# Patient Record
Sex: MALE | Race: OTHER | HISPANIC OR LATINO | Employment: FULL TIME | URBAN - METROPOLITAN AREA
[De-identification: names, ages, dates, MRNs, and addresses within clinical notes are randomized per-mention and may not be internally consistent; named-entity substitution may affect disease eponyms.]

---

## 2018-05-30 ENCOUNTER — HOSPITAL ENCOUNTER (EMERGENCY)
Facility: HOSPITAL | Age: 28
Discharge: HOME | End: 2018-05-30
Attending: EMERGENCY MEDICINE
Payer: COMMERCIAL

## 2018-05-30 VITALS
RESPIRATION RATE: 16 BRPM | BODY MASS INDEX: 46.65 KG/M2 | TEMPERATURE: 99.9 F | WEIGHT: 315 LBS | OXYGEN SATURATION: 96 % | DIASTOLIC BLOOD PRESSURE: 80 MMHG | HEIGHT: 69 IN | SYSTOLIC BLOOD PRESSURE: 138 MMHG | HEART RATE: 119 BPM

## 2018-05-30 DIAGNOSIS — J03.00 STREPTOCOCCAL TONSILLOPHARYNGITIS: Primary | ICD-10-CM

## 2018-05-30 PROCEDURE — 99283 EMERGENCY DEPT VISIT LOW MDM: CPT

## 2018-05-30 RX ORDER — AMOXICILLIN 250 MG/5ML
500 POWDER, FOR SUSPENSION ORAL ONCE
Status: COMPLETED | OUTPATIENT
Start: 2018-05-30 | End: 2018-05-30

## 2018-05-30 RX ORDER — AMOXICILLIN 400 MG/5ML
800 POWDER, FOR SUSPENSION ORAL 2 TIMES DAILY
Qty: 100 ML | Refills: 0 | Status: SHIPPED | OUTPATIENT
Start: 2018-05-30 | End: 2018-06-09

## 2018-05-30 RX ADMIN — AMOXICILLIN 500 MG: 250 POWDER, FOR SUSPENSION ORAL at 22:30

## 2018-05-31 NOTE — ED PROVIDER NOTES
History  Chief Complaint   Patient presents with    Fever - 9 weeks to 74 years     pt c/o fever 103 0 today    Generalized Body Aches     Started today    Headache     started today    Sore Throat     Started yesterday       This 82-year-old man complains of fever and myalgias with sore throat  This began yesterday  Today his temperature was 103°  He took DayQuil  Patient denies headache, cough, GI and  symptoms  Has no rash  He denies recent foreign travel or exposure to ill people except for his wife who is being treated for pneumonia  None       History reviewed  No pertinent past medical history  History reviewed  No pertinent surgical history  History reviewed  No pertinent family history  I have reviewed and agree with the history as documented  Social History   Substance Use Topics    Smoking status: Current Every Day Smoker     Types: Cigarettes    Smokeless tobacco: Never Used    Alcohol use No        Review of Systems   Constitutional: Positive for fever  Negative for activity change, appetite change and unexpected weight change  HENT: Positive for postnasal drip, sinus pressure and sore throat  Negative for ear pain  Eyes: Negative  Respiratory: Negative  Cardiovascular: Negative  Gastrointestinal: Negative  Endocrine: Negative  Genitourinary: Negative  Musculoskeletal: Positive for myalgias  Negative for arthralgias and neck stiffness  Skin: Negative  Allergic/Immunologic: Negative  Neurological: Negative  Hematological: Negative  Psychiatric/Behavioral: Negative  All other systems reviewed and are negative  Physical Exam  Physical Exam   Constitutional: He is oriented to person, place, and time  He appears well-developed and well-nourished  No distress  obese   HENT:   Head: Normocephalic and atraumatic     Right Ear: External ear normal    Left Ear: External ear normal    Both tonsils are enlarged, red and with exudates  No sign of peritonsillar abscess  Normal vocalizations  Handling secretions normally  Eyes: Conjunctivae and EOM are normal  Pupils are equal, round, and reactive to light  Neck: Normal range of motion  Neck supple  No JVD present  Cardiovascular: Normal rate, regular rhythm, normal heart sounds and intact distal pulses  No murmur heard  Pulmonary/Chest: Effort normal and breath sounds normal    Abdominal: Soft  Bowel sounds are normal  He exhibits no distension and no mass  There is no tenderness  There is no rebound and no guarding  Musculoskeletal: Normal range of motion  He exhibits no edema or tenderness  Lymphadenopathy:     He has no cervical adenopathy  Neurological: He is alert and oriented to person, place, and time  He has normal reflexes  No cranial nerve deficit  Coordination normal    Skin: Skin is warm and dry  Capillary refill takes less than 2 seconds  No rash noted  He is not diaphoretic  Psychiatric: He has a normal mood and affect  His behavior is normal    Nursing note and vitals reviewed        Vital Signs  ED Triage Vitals [05/30/18 2159]   Temperature Pulse Respirations Blood Pressure SpO2   99 9 °F (37 7 °C) (!) 119 16 138/80 96 %      Temp Source Heart Rate Source Patient Position - Orthostatic VS BP Location FiO2 (%)   Temporal Monitor Sitting Right arm --      Pain Score       7           Vitals:    05/30/18 2159   BP: 138/80   Pulse: (!) 119   Patient Position - Orthostatic VS: Sitting       Visual Acuity  Visual Acuity      Most Recent Value   L Pupil Size (mm)  3   R Pupil Size (mm)  3          ED Medications  Medications   amoxicillin (AMOXIL) 250 mg/5 mL oral suspension 500 mg (not administered)       Diagnostic Studies  Results Reviewed     None                 No orders to display              Procedures  Procedures       Phone Contacts  ED Phone Contact    ED Course                               MDM  Number of Diagnoses or Management Options  Streptococcal tonsillopharyngitis: new and does not require workup    CritCare Time    Disposition  Final diagnoses:   Streptococcal tonsillopharyngitis     Time reflects when diagnosis was documented in both MDM as applicable and the Disposition within this note     Time User Action Codes Description Comment    5/30/2018 10:23 PM Jae Faustinotaz Add [J03 00] Streptococcal tonsillopharyngitis       ED Disposition     ED Disposition Condition Comment    Discharge  Yamileth Pennington discharge to home/self care  Condition at discharge: Stable        Follow-up Information     Follow up With Specialties Details Why Jose Puckett  Call  to find a local doctor for follow-up [phone]            Patient's Medications   Discharge Prescriptions    AMOXICILLIN (AMOXIL) 400 MG/5ML SUSPENSION    Take 10 mL (800 mg total) by mouth 2 (two) times a day for 10 days       Start Date: 5/30/2018 End Date: 6/9/2018       Order Dose: 800 mg       Quantity: 100 mL    Refills: 0     No discharge procedures on file      ED Provider  Electronically Signed by           Debbie Marin DO  05/30/18 9033

## 2018-05-31 NOTE — DISCHARGE INSTRUCTIONS
Tonsillitis, Ambulatory Care   GENERAL INFORMATION:   Tonsillitis  is an inflammation of the tonsils  Tonsils are 2 large lumps of tissue in the back of your throat  They help fight infection  Tonsillitis may be caused by a bacterial or a viral infection  Common symptoms include the following:   · Severe sore throat    · Red, swollen tonsils    · Painful swallowing    · Fever and chills    · Bad breath    · White spots on the tonsils  Seek immediate care  if you have trouble breathing because your tonsils are swollen  Treatment for tonsillitis  may include medicine to decrease throat pain  Antibiotic medicine may be given if your tonsillitis was caused by bacteria  You may also need surgery to remove your tonsils for chronic or recurrent tonsillitis  Prevent the spread of germs  by washing your hands often  Do not share food or drinks with anyone  Ask when you can return to work  Manage your symptoms:   · Drink plenty of liquids  to help prevent dehydration  Ask your healthcare provider how much you should drink  · Gargle with warm salt water  to help decrease throat pain  Mix 1 teaspoon of salt in 1 cup of warm water  Ask how often you should do this  Follow up with your healthcare provider as directed:  Write down your questions so you remember to ask them during your visits  CARE AGREEMENT:   You have the right to help plan your care  Learn about your health condition and how it may be treated  Discuss treatment options with your caregivers to decide what care you want to receive  You always have the right to refuse treatment  The above information is an  only  It is not intended as medical advice for individual conditions or treatments  Talk to your doctor, nurse or pharmacist before following any medical regimen to see if it is safe and effective for you    © 2014 [address] [employer] is for End User's use only and may not be sold, redistributed or otherwise used for commercial purposes  All illustrations and images included in CareNotes® are the copyrighted property of A D A M , Inc  or Alex Ann

## 2020-01-18 ENCOUNTER — APPOINTMENT (EMERGENCY)
Dept: RADIOLOGY | Facility: HOSPITAL | Age: 30
End: 2020-01-18
Payer: COMMERCIAL

## 2020-01-18 ENCOUNTER — HOSPITAL ENCOUNTER (EMERGENCY)
Facility: HOSPITAL | Age: 30
Discharge: HOME | End: 2020-01-18
Attending: EMERGENCY MEDICINE | Admitting: EMERGENCY MEDICINE
Payer: COMMERCIAL

## 2020-01-18 VITALS
HEART RATE: 70 BPM | RESPIRATION RATE: 16 BRPM | SYSTOLIC BLOOD PRESSURE: 110 MMHG | OXYGEN SATURATION: 99 % | WEIGHT: 315 LBS | BODY MASS INDEX: 46.65 KG/M2 | DIASTOLIC BLOOD PRESSURE: 58 MMHG | TEMPERATURE: 97.9 F | HEIGHT: 69 IN

## 2020-01-18 DIAGNOSIS — R07.89 ATYPICAL CHEST PAIN: Primary | ICD-10-CM

## 2020-01-18 LAB
ALBUMIN SERPL BCP-MCNC: 3.5 G/DL (ref 3.5–5)
ALP SERPL-CCNC: 103 U/L (ref 46–116)
ALT SERPL W P-5'-P-CCNC: 34 U/L (ref 12–78)
ANION GAP SERPL CALCULATED.3IONS-SCNC: 8 MMOL/L (ref 4–13)
AST SERPL W P-5'-P-CCNC: 16 U/L (ref 5–45)
BASOPHILS # BLD AUTO: 0.07 THOUSANDS/ΜL (ref 0–0.1)
BASOPHILS NFR BLD AUTO: 1 % (ref 0–1)
BILIRUB SERPL-MCNC: 0.3 MG/DL (ref 0.2–1)
BUN SERPL-MCNC: 16 MG/DL (ref 5–25)
CALCIUM SERPL-MCNC: 9.2 MG/DL (ref 8.3–10.1)
CHLORIDE SERPL-SCNC: 106 MMOL/L (ref 100–108)
CO2 SERPL-SCNC: 27 MMOL/L (ref 21–32)
CREAT SERPL-MCNC: 0.93 MG/DL (ref 0.6–1.3)
EOSINOPHIL # BLD AUTO: 0.38 THOUSAND/ΜL (ref 0–0.61)
EOSINOPHIL NFR BLD AUTO: 3 % (ref 0–6)
ERYTHROCYTE [DISTWIDTH] IN BLOOD BY AUTOMATED COUNT: 14.7 % (ref 11.6–15.1)
GFR SERPL CREATININE-BSD FRML MDRD: 112 ML/MIN/1.73SQ M
GLUCOSE SERPL-MCNC: 117 MG/DL (ref 65–140)
HCT VFR BLD AUTO: 41.3 % (ref 36.5–49.3)
HGB BLD-MCNC: 13.3 G/DL (ref 12–17)
IMM GRANULOCYTES # BLD AUTO: 0.08 THOUSAND/UL (ref 0–0.2)
IMM GRANULOCYTES NFR BLD AUTO: 1 % (ref 0–2)
LYMPHOCYTES # BLD AUTO: 2.46 THOUSANDS/ΜL (ref 0.6–4.47)
LYMPHOCYTES NFR BLD AUTO: 16 % (ref 14–44)
MCH RBC QN AUTO: 25.4 PG (ref 26.8–34.3)
MCHC RBC AUTO-ENTMCNC: 32.2 G/DL (ref 31.4–37.4)
MCV RBC AUTO: 79 FL (ref 82–98)
MONOCYTES # BLD AUTO: 0.74 THOUSAND/ΜL (ref 0.17–1.22)
MONOCYTES NFR BLD AUTO: 5 % (ref 4–12)
NEUTROPHILS # BLD AUTO: 11.3 THOUSANDS/ΜL (ref 1.85–7.62)
NEUTS SEG NFR BLD AUTO: 74 % (ref 43–75)
NRBC BLD AUTO-RTO: 0 /100 WBCS
PLATELET # BLD AUTO: 293 THOUSANDS/UL (ref 149–390)
PMV BLD AUTO: 9.3 FL (ref 8.9–12.7)
POTASSIUM SERPL-SCNC: 3.6 MMOL/L (ref 3.5–5.3)
PROT SERPL-MCNC: 7.5 G/DL (ref 6.4–8.2)
RBC # BLD AUTO: 5.24 MILLION/UL (ref 3.88–5.62)
SODIUM SERPL-SCNC: 141 MMOL/L (ref 136–145)
TROPONIN I SERPL-MCNC: <0.02 NG/ML
WBC # BLD AUTO: 15.03 THOUSAND/UL (ref 4.31–10.16)

## 2020-01-18 PROCEDURE — 80053 COMPREHEN METABOLIC PANEL: CPT

## 2020-01-18 PROCEDURE — 71046 X-RAY EXAM CHEST 2 VIEWS: CPT

## 2020-01-18 PROCEDURE — 36415 COLL VENOUS BLD VENIPUNCTURE: CPT

## 2020-01-18 PROCEDURE — 85025 COMPLETE CBC W/AUTO DIFF WBC: CPT

## 2020-01-18 PROCEDURE — 93005 ELECTROCARDIOGRAM TRACING: CPT

## 2020-01-18 PROCEDURE — 84484 ASSAY OF TROPONIN QUANT: CPT

## 2020-01-18 PROCEDURE — 99285 EMERGENCY DEPT VISIT HI MDM: CPT

## 2020-01-18 PROCEDURE — 99284 EMERGENCY DEPT VISIT MOD MDM: CPT | Performed by: PHYSICIAN ASSISTANT

## 2020-01-18 RX ORDER — ESCITALOPRAM OXALATE 20 MG/1
20 TABLET ORAL DAILY
COMMUNITY

## 2020-01-18 RX ORDER — GABAPENTIN 600 MG/1
600 TABLET ORAL 3 TIMES DAILY
COMMUNITY

## 2020-01-18 RX ORDER — TOPIRAMATE 25 MG/1
25 TABLET ORAL 2 TIMES DAILY
COMMUNITY

## 2020-01-18 RX ORDER — PANTOPRAZOLE SODIUM 40 MG/1
40 TABLET, DELAYED RELEASE ORAL DAILY
Qty: 14 TABLET | Refills: 0 | Status: SHIPPED | OUTPATIENT
Start: 2020-01-18 | End: 2020-02-01

## 2020-01-18 RX ORDER — ROSUVASTATIN CALCIUM 10 MG/1
10 TABLET, COATED ORAL DAILY
COMMUNITY

## 2020-01-18 NOTE — ED PROVIDER NOTES
History  Chief Complaint   Patient presents with    Chest Pain     Pt started to have chest pain/palpitations @1330, +nausea     53-year-old with history of anxiety and morbid obesity presents emergency department for evaluation chest pain  Patient relays a history intermittent episodes of central chest discomfort described as squeezing over the past several months  He previously had this evaluated at [facility] by Cardiology, underwent a stress test and echocardiogram which were reportedly unremarkable  States today while driving his car developed severe pain and felt like I was going to die  Pain lasted less than 10 minutes before resolving spontaneously  He denies associated nausea, vomiting, dizziness, extremity paresthesias  The symptoms are typical for his recent history of chest pain  Typically does not experience exertional worsening with his symptoms  He does have a strong family history of cardiovascular disease, as his mother  of a heart attack at the age of 34  He has no personal history of hypertension, hyperlipidemia, diabetes, and is a nonsmoker          Prior to Admission Medications   Prescriptions Last Dose Informant Patient Reported? Taking?   escitalopram (LEXAPRO) 20 mg tablet 2020 at Unknown time  Yes Yes   Sig: Take 20 mg by mouth daily   gabapentin (NEURONTIN) 600 MG tablet 2020 at Unknown time  Yes Yes   Sig: Take 600 mg by mouth 3 (three) times a day   rosuvastatin (CRESTOR) 10 MG tablet 2020 at Unknown time  Yes Yes   Sig: Take 10 mg by mouth daily   topiramate (TOPAMAX) 25 mg tablet 2020 at Unknown time  Yes Yes   Sig: Take 25 mg by mouth 2 (two) times a day      Facility-Administered Medications: None       History reviewed  No pertinent past medical history  History reviewed  No pertinent surgical history  History reviewed  No pertinent family history  I have reviewed and agree with the history as documented      Social History     Tobacco Use  Smoking status: Former Smoker     Types: Cigarettes    Smokeless tobacco: Never Used   Substance Use Topics    Alcohol use: No    Drug use: No        Review of Systems   Constitutional: Negative for chills, diaphoresis and fever  Eyes: Negative for visual disturbance  Respiratory: Negative for cough and shortness of breath  Cardiovascular: Positive for chest pain  Negative for palpitations  Gastrointestinal: Negative for abdominal pain, diarrhea, nausea and vomiting  Genitourinary: Negative for dysuria, flank pain and frequency  Musculoskeletal: Negative for arthralgias and myalgias  Skin: Negative for color change, rash and wound  Allergic/Immunologic: Negative for immunocompromised state  Neurological: Negative for dizziness and light-headedness  Hematological: Does not bruise/bleed easily  Psychiatric/Behavioral: Negative for confusion  The patient is nervous/anxious  Physical Exam  Physical Exam   Constitutional: He is oriented to person, place, and time  He appears well-developed and well-nourished  No distress  HENT:   Head: Normocephalic and atraumatic  Eyes: Pupils are equal, round, and reactive to light  No scleral icterus  Neck: No JVD present  Cardiovascular: Normal rate and regular rhythm  Exam reveals no gallop and no friction rub  No murmur heard  Pulses:       Radial pulses are 2+ on the right side, and 2+ on the left side  Pulmonary/Chest: No respiratory distress  He has no wheezes  He has no rhonchi  He has no rales  Musculoskeletal:        Right lower leg: He exhibits no edema  Left lower leg: He exhibits no edema  Neurological: He is alert and oriented to person, place, and time  Skin: Skin is warm and dry  Capillary refill takes less than 2 seconds  He is not diaphoretic  Vitals reviewed        Vital Signs  ED Triage Vitals [01/18/20 1450]   Temperature Pulse Respirations Blood Pressure SpO2   97 9 °F (36 6 °C) 83 (!) 24 145/63 96 % Temp Source Heart Rate Source Patient Position - Orthostatic VS BP Location FiO2 (%)   Temporal Monitor Lying Right arm --      Pain Score       4           Vitals:    01/18/20 1450 01/18/20 1515 01/18/20 1555   BP: 145/63 132/68 110/58   Pulse: 83 73 70   Patient Position - Orthostatic VS: Lying Lying Lying         Visual Acuity      ED Medications  Medications - No data to display    Diagnostic Studies  Results Reviewed     Procedure Component Value Units Date/Time    Comprehensive metabolic panel [[id number]] Collected:  01/18/20 1457    Lab Status:  Final result Specimen:  Blood from Hand, Left Updated:  01/18/20 1539     Sodium 141 mmol/L      Potassium 3 6 mmol/L      Chloride 106 mmol/L      CO2 27 mmol/L      ANION GAP 8 mmol/L      BUN 16 mg/dL      Creatinine 0 93 mg/dL      Glucose 117 mg/dL      Calcium 9 2 mg/dL      AST 16 U/L      ALT 34 U/L      Alkaline Phosphatase 103 U/L      Total Protein 7 5 g/dL      Albumin 3 5 g/dL      Total Bilirubin 0 30 mg/dL      eGFR 112 ml/min/1 73sq m     Narrative:       [place] guidelines for Chronic Kidney Disease (CKD):     Stage 1 with normal or high GFR (GFR > 90 mL/min/1 73 square meters)    Stage 2 Mild CKD (GFR = 60-89 mL/min/1 73 square meters)    Stage 3A Moderate CKD (GFR = 45-59 mL/min/1 73 square meters)    Stage 3B Moderate CKD (GFR = 30-44 mL/min/1 73 square meters)    Stage 4 Severe CKD (GFR = 15-29 mL/min/1 73 square meters)    Stage 5 End Stage CKD (GFR <15 mL/min/1 73 square meters)  Note: GFR calculation is accurate only with a steady state creatinine    Troponin I [[id number]]  (Normal) Collected:  01/18/20 1457    Lab Status:  Final result Specimen:  Blood from Hand, Left Updated:  01/18/20 1528     Troponin I <0 02 ng/mL     CBC and differential [[id number]]  (Abnormal) Collected:  01/18/20 1457    Lab Status:  Final result Specimen:  Blood from Hand, Left Updated:  01/18/20 1508     WBC 15 03 Thousand/uL RBC 5 24 Million/uL      Hemoglobin 13 3 g/dL      Hematocrit 41 3 %      MCV 79 fL      MCH 25 4 pg      MCHC 32 2 g/dL      RDW 14 7 %      MPV 9 3 fL      Platelets 428 Thousands/uL      nRBC 0 /100 WBCs      Neutrophils Relative 74 %      Immat GRANS % 1 %      Lymphocytes Relative 16 %      Monocytes Relative 5 %      Eosinophils Relative 3 %      Basophils Relative 1 %      Neutrophils Absolute 11 30 Thousands/µL      Immature Grans Absolute 0 08 Thousand/uL      Lymphocytes Absolute 2 46 Thousands/µL      Monocytes Absolute 0 74 Thousand/µL      Eosinophils Absolute 0 38 Thousand/µL      Basophils Absolute 0 07 Thousands/µL                  XR chest 2 views   ED Interpretation by Poonam Hearn PA-C (01/18 1535)   No acute disease      Final Result by Estephania Meade MD (01/18 1639)      No acute cardiopulmonary disease              Workstation performed: [id number]                    Procedures  ECG 12 Lead Documentation Only  Date/Time: 1/18/2020 2:52 PM  Performed by: Poonam Hearn PA-C  Authorized by: Poonam Hearn PA-C     Indications / Diagnosis:  Chest pain  ECG reviewed by me, the ED Provider: yes    Patient location:  ED  Previous ECG:     Previous ECG:  Unavailable  Interpretation:     Interpretation: normal    Rate:     ECG rate:  76    ECG rate assessment: normal    Rhythm:     Rhythm: sinus rhythm    Ectopy:     Ectopy: none    QRS:     QRS axis:  Normal    QRS intervals:  Normal  Conduction:     Conduction: normal    ST segments:     ST segments:  Normal  T waves:     T waves: normal               ED Course         HEART Risk Score      Most Recent Value   History  0 Filed at: 01/18/2020 1532   ECG  0 Filed at: 01/18/2020 1532   Age  0 Filed at: 01/18/2020 1532   Risk Factors  1 Filed at: 01/18/2020 1532   Troponin  0 Filed at: 01/18/2020 1532   Heart Score Risk Calculator   History  0 Filed at: 01/18/2020 1532   ECG  0 Filed at: 01/18/2020 1532   Age  0 Filed at: 01/18/2020 1532   Risk Factors  1 Filed at: 01/18/2020 1532   Troponin  0 Filed at: 01/18/2020 1532   HEART Score  1 Filed at: 01/18/2020 1532   HEART Score  1 Filed at: 01/18/2020 1532                            [facility]  Number of Diagnoses or Management Options  Atypical chest pain: new and requires workup  Diagnosis management comments: Patient remained asymptomatic in the emergency department  EKG is unremarkable and cardiac enzymes are negative  Low level suspicion for cardiac source, particularly given his negative stress test and echocardiogram in the past   Recommend he follow-up with his primary care doctor for further risk stratification  Meets perc criteria, no evidence of PE  Amount and/or Complexity of Data Reviewed  Clinical lab tests: ordered and reviewed  Tests in the radiology section of CPT®: ordered and reviewed  Tests in the medicine section of CPT®: ordered and reviewed  Review and summarize past medical records: yes  Independent visualization of images, tracings, or specimens: yes          Disposition  Final diagnoses:   Atypical chest pain     Time reflects when diagnosis was documented in both MDM as applicable and the Disposition within this note     Time User Action Codes Description Comment    1/18/2020  3:41 PM Devang Hutson Add [R07 89] Atypical chest pain       ED Disposition     ED Disposition Condition Date/Time Comment    Discharge Stable Sat Jan 18, 2020  3:41 PM Baldo Mtz discharge to home/self care              Follow-up Information     Follow up With Specialties Details Why Contact Info Additional [facility] Schedule an appointment as soon as possible for a visit   Cecily De La Cruz [phone] [facility] [phone]  [address]  Cardiology [phone], [address] [place] [address] Gas[facility] Devine, [place], [facility]          Discharge Medication List as of 1/18/2020  3:49 PM      CONTINUE these medications which have NOT CHANGED    Details   escitalopram (LEXAPRO) 20 mg tablet Take 20 mg by mouth daily, Historical Med      gabapentin (NEURONTIN) 600 MG tablet Take 600 mg by mouth 3 (three) times a day, Historical Med      rosuvastatin (CRESTOR) 10 MG tablet Take 10 mg by mouth daily, Historical Med      topiramate (TOPAMAX) 25 mg tablet Take 25 mg by mouth 2 (two) times a day, Historical Med           No discharge procedures on file      ED Provider  Electronically Signed by           Ninfa Malik PA-C  01/18/20 9545

## 2020-01-19 LAB
ATRIAL RATE: 76 BPM
P AXIS: 38 DEGREES
PR INTERVAL: 166 MS
QRS AXIS: 58 DEGREES
QRSD INTERVAL: 82 MS
QT INTERVAL: 370 MS
QTC INTERVAL: 416 MS
T WAVE AXIS: 31 DEGREES
VENTRICULAR RATE: 76 BPM

## 2020-01-19 PROCEDURE — 93010 ELECTROCARDIOGRAM REPORT: CPT | Performed by: INTERNAL MEDICINE

## 2022-04-18 ENCOUNTER — APPOINTMENT (OUTPATIENT)
Dept: LAB | Facility: CLINIC | Age: 32
End: 2022-04-18
Payer: COMMERCIAL

## 2022-04-18 DIAGNOSIS — R06.02 SHORTNESS OF BREATH: ICD-10-CM

## 2022-04-18 DIAGNOSIS — R41.89 BRAIN FOG: ICD-10-CM

## 2022-04-18 DIAGNOSIS — E66.01 MORBID OBESITY (HCC): ICD-10-CM

## 2022-04-18 DIAGNOSIS — E78.2 MIXED HYPERLIPIDEMIA: ICD-10-CM

## 2022-04-18 DIAGNOSIS — J45.40 MODERATE PERSISTENT ASTHMA WITHOUT COMPLICATION: ICD-10-CM

## 2022-04-18 DIAGNOSIS — R52 BODY ACHES: ICD-10-CM

## 2022-04-18 DIAGNOSIS — I10 ESSENTIAL HYPERTENSION: ICD-10-CM

## 2022-04-18 DIAGNOSIS — M25.50 ARTHRALGIA, UNSPECIFIED JOINT: ICD-10-CM

## 2022-04-18 LAB
ALBUMIN SERPL BCP-MCNC: 3.5 G/DL (ref 3.5–5)
ALP SERPL-CCNC: 101 U/L (ref 46–116)
ALT SERPL W P-5'-P-CCNC: 44 U/L (ref 12–78)
ANION GAP SERPL CALCULATED.3IONS-SCNC: 6 MMOL/L (ref 4–13)
AST SERPL W P-5'-P-CCNC: 20 U/L (ref 5–45)
BASOPHILS # BLD AUTO: 0.09 THOUSANDS/ΜL (ref 0–0.1)
BASOPHILS NFR BLD AUTO: 1 % (ref 0–1)
BILIRUB SERPL-MCNC: 0.53 MG/DL (ref 0.2–1)
BUN SERPL-MCNC: 12 MG/DL (ref 5–25)
CALCIUM SERPL-MCNC: 9.2 MG/DL (ref 8.3–10.1)
CHLORIDE SERPL-SCNC: 110 MMOL/L (ref 100–108)
CHOLEST SERPL-MCNC: 192 MG/DL
CO2 SERPL-SCNC: 26 MMOL/L (ref 21–32)
CREAT SERPL-MCNC: 0.91 MG/DL (ref 0.6–1.3)
CRP SERPL HS-MCNC: 26.1 MG/L
EOSINOPHIL # BLD AUTO: 0.5 THOUSAND/ΜL (ref 0–0.61)
EOSINOPHIL NFR BLD AUTO: 5 % (ref 0–6)
ERYTHROCYTE [DISTWIDTH] IN BLOOD BY AUTOMATED COUNT: 15.5 % (ref 11.6–15.1)
ERYTHROCYTE [SEDIMENTATION RATE] IN BLOOD: 47 MM/HOUR (ref 0–14)
GFR SERPL CREATININE-BSD FRML MDRD: 113 ML/MIN/1.73SQ M
GLUCOSE P FAST SERPL-MCNC: 84 MG/DL (ref 65–99)
HCT VFR BLD AUTO: 44.7 % (ref 36.5–49.3)
HDLC SERPL-MCNC: 29 MG/DL
HGB BLD-MCNC: 14.1 G/DL (ref 12–17)
IMM GRANULOCYTES # BLD AUTO: 0.05 THOUSAND/UL (ref 0–0.2)
IMM GRANULOCYTES NFR BLD AUTO: 1 % (ref 0–2)
LDLC SERPL CALC-MCNC: 134 MG/DL (ref 0–100)
LYMPHOCYTES # BLD AUTO: 2.02 THOUSANDS/ΜL (ref 0.6–4.47)
LYMPHOCYTES NFR BLD AUTO: 22 % (ref 14–44)
MCH RBC QN AUTO: 25.3 PG (ref 26.8–34.3)
MCHC RBC AUTO-ENTMCNC: 31.5 G/DL (ref 31.4–37.4)
MCV RBC AUTO: 80 FL (ref 82–98)
MONOCYTES # BLD AUTO: 0.64 THOUSAND/ΜL (ref 0.17–1.22)
MONOCYTES NFR BLD AUTO: 7 % (ref 4–12)
NEUTROPHILS # BLD AUTO: 6.06 THOUSANDS/ΜL (ref 1.85–7.62)
NEUTS SEG NFR BLD AUTO: 64 % (ref 43–75)
NONHDLC SERPL-MCNC: 163 MG/DL
NRBC BLD AUTO-RTO: 0 /100 WBCS
PLATELET # BLD AUTO: 305 THOUSANDS/UL (ref 149–390)
PMV BLD AUTO: 10.5 FL (ref 8.9–12.7)
POTASSIUM SERPL-SCNC: 4.4 MMOL/L (ref 3.5–5.3)
PROT SERPL-MCNC: 7.4 G/DL (ref 6.4–8.2)
RBC # BLD AUTO: 5.57 MILLION/UL (ref 3.88–5.62)
SODIUM SERPL-SCNC: 142 MMOL/L (ref 136–145)
TRIGL SERPL-MCNC: 147 MG/DL
WBC # BLD AUTO: 9.36 THOUSAND/UL (ref 4.31–10.16)

## 2022-04-18 PROCEDURE — 86038 ANTINUCLEAR ANTIBODIES: CPT

## 2022-04-18 PROCEDURE — 85652 RBC SED RATE AUTOMATED: CPT

## 2022-04-18 PROCEDURE — 36415 COLL VENOUS BLD VENIPUNCTURE: CPT

## 2022-04-18 PROCEDURE — 85025 COMPLETE CBC W/AUTO DIFF WBC: CPT

## 2022-04-18 PROCEDURE — 86141 C-REACTIVE PROTEIN HS: CPT

## 2022-04-18 PROCEDURE — 86618 LYME DISEASE ANTIBODY: CPT

## 2022-04-18 PROCEDURE — 80053 COMPREHEN METABOLIC PANEL: CPT

## 2022-04-18 PROCEDURE — 80061 LIPID PANEL: CPT

## 2022-04-19 LAB
ANA SPECKLED TITR SER: ABNORMAL {TITER}
ANA TITR SER IF: POSITIVE {TITER}
B BURGDOR IGG+IGM SER-ACNC: 17
Lab: ABNORMAL
SL AMB NOTE:: ABNORMAL

## 2023-03-08 ENCOUNTER — OFFICE VISIT (OUTPATIENT)
Dept: BEHAVIORAL/MENTAL HEALTH CLINIC | Facility: CLINIC | Age: 33
End: 2023-03-08

## 2023-03-08 ENCOUNTER — DOCUMENTATION (OUTPATIENT)
Dept: BEHAVIORAL/MENTAL HEALTH CLINIC | Facility: CLINIC | Age: 33
End: 2023-03-08

## 2023-03-08 DIAGNOSIS — F32.A ANXIETY AND DEPRESSION: Primary | ICD-10-CM

## 2023-03-08 DIAGNOSIS — F41.9 ANXIETY AND DEPRESSION: Primary | ICD-10-CM

## 2023-03-08 NOTE — PROGRESS NOTES
Assessment      Crisis Intake  Patient Intake  Living Arrangement: Apartment  Can patient return home?: Yes  Address to be Discharge to<[facility]> [address] [place] [address]  Nga LIZ  Patient's Telephone Number: [phone]  Access to Firearms: No  Type of Work:   Work History: Full-time  Admission Status  Status of Admission: Other (Comment) (pt looking for outpatient service)  [place] of Residence: [address] [place] [address] Notified?: No  Patient History  Presenting Problems: Pt is a 29year old male currently  with 4 children  Pt and spouse moved into [facility] one year ago and has not had outpatient services since this time  Pt  Had own jason business, 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  Due to work, pt would be gone for several weeks at a time, which contributed to stressors in the marriage  Pt  Now works for a [employer] and is home more often, but still in debt due to his former company closing  PT admits that some poor business decisions had contributed to the business closure  Pt also admits to some other stressors in the marriage including viewing pornograpy, and being four months behind in their rent for their residence  Pt has noticed over the past few weeks that his wife has been texting someone , but has been secretive about whom she was texting  When confronted, the wife admits that she was texting a former therapist  This evening the wife was texting again to the therapist and around midnight, there was a phone call from "[address]" on the wife's phone  The pt was about to answer his wife's phone, but the wife stated that this was the title she put in her phone for the former therapist  The pt's wife suffers with depression  There are four children, the oldest being [de-identified], and the youngest being born in Nov 2022  The pt  Worries about the children and his wife being alone all day, and having no services  The wife suffers from depression   Pt fears that they will end in divorce, but would like to salvage their marriage  As of this time, the pt is requesting out patient services with a psych and therapist  These appointments have been set up for March 30, at 1 PM with Psych and 2 PM with the therapist  Treatment History: PT had out patient services over a year ago, but had not followed up  Currently in Treatment: No  Substance Abuse: No  Mental Status Exam  Orientation Level: Oriented X4  Affect: Appropriate  Speech: Logical/coherent, Soft  Mood: Depressed  Thought Content: Appropriate  Hallucination Type: No problems reported or observed    Judgement: Good  Impulse Control: Good  Attention Span: Appropriate for age  Memory: Intact  Appetite: Good  Sleep: Fair  Total Hours of Sleep: 6-8  Appear/Hygiene: Neatly Groomed  Strengths and Limitations  Patient Strengths: Self reliant, Insightful, Physically healthy, Cooperative, Interpersonal skills, Negotiates basic needs  Patient Limitations: Limited family ties, Limited support system  Ideations  Current Self Harm/Suicidal Ideation: No  Previous Self Harm/Suicidal Ideation: No  Violence Risk to Self: Denies ideation within past 6 months  Current homicidal or violent thoughts toward another: Denies ideations within past 6 months  Description of current thoughts/plans[de-identified] no current thoughts or plans  Previous Plans to Harm Another Person: No  Violence Risk to Others: Denies within past 6 months  Previous History of Violence to Others: No  Provisional Diagnosis  Axis I: anxiety  Axis II: depression  Intake Assessment Outcome  Patient Plan: Outpatient    C-SSRS         Patient was referred by: Self or Family    Visit start and stop times:    03/08/23  Start Time: (P) 0130  Stop Time: (P) 0200  Total Visit Time: (P) 30 minutes            Pt was scheduled with Ines for 3/30/23 and Kurt Goldberg for 3/30/23        SAFETY PLAN  Warning Signs (thoughts, images, mood, behavior, situations) of a potential crisis: Pt fears divorce and has already started filing paper work  Coping Skills (what can I do to take my mind off the problem, or to keep myself safe): 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

## 2023-03-08 NOTE — PATIENT INSTRUCTIONS
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

## 2023-03-09 ENCOUNTER — TELEPHONE (OUTPATIENT)
Dept: BEHAVIORAL/MENTAL HEALTH CLINIC | Facility: CLINIC | Age: 33
End: 2023-03-09

## 2023-03-30 ENCOUNTER — EVALUATION (OUTPATIENT)
Dept: PHYSICAL THERAPY | Facility: CLINIC | Age: 33
End: 2023-03-30

## 2023-03-30 ENCOUNTER — OFFICE VISIT (OUTPATIENT)
Dept: BEHAVIORAL/MENTAL HEALTH CLINIC | Facility: CLINIC | Age: 33
End: 2023-03-30

## 2023-03-30 ENCOUNTER — OFFICE VISIT (OUTPATIENT)
Dept: PSYCHIATRY | Facility: CLINIC | Age: 33
End: 2023-03-30

## 2023-03-30 VITALS — SYSTOLIC BLOOD PRESSURE: 125 MMHG | HEART RATE: 80 BPM | DIASTOLIC BLOOD PRESSURE: 80 MMHG

## 2023-03-30 DIAGNOSIS — F41.0 PANIC DISORDER WITHOUT AGORAPHOBIA: ICD-10-CM

## 2023-03-30 DIAGNOSIS — F41.1 GAD (GENERALIZED ANXIETY DISORDER): Primary | ICD-10-CM

## 2023-03-30 DIAGNOSIS — F33.0 MILD EPISODE OF RECURRENT MAJOR DEPRESSIVE DISORDER (HCC): ICD-10-CM

## 2023-03-30 DIAGNOSIS — F41.1 GENERALIZED ANXIETY DISORDER: Primary | ICD-10-CM

## 2023-03-30 DIAGNOSIS — F33.1 MAJOR DEPRESSIVE DISORDER, RECURRENT, MODERATE (HCC): ICD-10-CM

## 2023-03-30 DIAGNOSIS — R42 DIZZINESS: Primary | ICD-10-CM

## 2023-03-30 RX ORDER — ADALIMUMAB 40MG/0.4ML
KIT SUBCUTANEOUS
COMMUNITY
Start: 2023-03-02

## 2023-03-30 RX ORDER — BUSPIRONE HYDROCHLORIDE 15 MG/1
15 TABLET ORAL 2 TIMES DAILY
COMMUNITY
Start: 2021-03-29 | End: 2023-04-26

## 2023-03-30 RX ORDER — LANOLIN ALCOHOL/MO/W.PET/CERES
100 CREAM (GRAM) TOPICAL DAILY
COMMUNITY
Start: 2023-03-21 | End: 2024-03-20

## 2023-03-30 RX ORDER — ESCITALOPRAM OXALATE 10 MG/1
10 TABLET ORAL DAILY
Qty: 30 TABLET | Refills: 0 | Status: SHIPPED | OUTPATIENT
Start: 2023-03-30

## 2023-03-30 RX ORDER — PROPRANOLOL HYDROCHLORIDE 10 MG/1
10 TABLET ORAL AS NEEDED
Qty: 30 TABLET | Refills: 0 | Status: SHIPPED | OUTPATIENT
Start: 2023-03-30

## 2023-03-30 NOTE — PROGRESS NOTES
PT Evaluation     Today's date: 3/30/2023  Patient name: Lisset Phillips III  : 1990  MRN: [id number]  Referring provider: No ref  provider found  Dx:   Encounter Diagnosis     ICD-10-CM    1  Dizziness  R42           Start Time: 730  Stop Time: 815  Total time in clinic (min): 45 minutes    Assessment  Assessment details: Pt is a 29 yr old male presenting to skilled outpatient physical therapy with chief complaint of dizziness and feeling unsteady  His current impairments include double vision, headaches, lightheadedness, and feeling like he's floating  His oculomotor testing showed positive gaze holding nystagmus in his L eye only  While performing, occulomotor exam, patient reported he has diplopia and double vision is worse in his L eye, so eye doctor referral was recommended  During FGA, he demo good balance but was symptomatic with amb with HT  Due to visual impairments and diplopia, OT eval recommended to determine if OT services are warranted to address visual deficits  Visual impairments likely main cause of his dizziness however due to hx of HA, vestibular migraines may be possible differential diagnosis  His current impairments listed above prevent him from performing ADL's I, working as a , and performing outdoor activities at his house  He would be a good candidate for skilled physical therapy to address these functional limitations and return him to his PLOF  Understanding of Dx/Px/POC: good   Prognosis: good    Goals  STG 3 weeks:  1  Pt will demo understanding of POC/diagnoses and HEP  2  Pt will report a subjective decrease in his dizziness and s/s by 50%  LTG 6 weeks:  1  Pt will improve his FGA score > 3 points or more  2  Pt will be able to perform ADL's w/o limitations       Plan  Patient would benefit from: skilled occupational therapy and skilled physical therapy  Referral necessary: Yes  Planned therapy interventions: neuromuscular re-education, therapeutic activities, "therapeutic exercise, balance/weight bearing training, graded activity and home exercise program  Frequency: 2x week  Duration in weeks: 6  Plan of Care beginning date: 3/30/2023  Plan of Care expiration date: 5/30/2023  Treatment plan discussed with: patient        Subjective Evaluation    History of Present Illness  Mechanism of injury: Since December, he has had dizziness and eyesight has changed and went to [facility] but urgent care said there was no change  Things seems to move and he is sensitive to light  Had \"EEG\" performed while in  but did not show anything  Driving at night w/ snow was very difficult and disorienting  Hx of fall about 2 months ago, felt like he lost sense of balance  Feels brain is floating and is very \"light\"  Dizziness progressively worse over the last 2-3 weeks and currently having double vision  Works as   Better: sleeping  Worse: blood pressure going up, strenuous activities, walking up stairs quickly  Floaters across the L eye more recently  Did also report this weird sensation of his vision being affected in his L eye, where it feels like areas turn black/white and static looking  Recent N/T in b/l hands w/ 5th digit  Not a recurrent problem   Quality of life: good    Pain  No pain reported    Treatments  No previous or current treatments  Patient Goals  Patient goal: feel better, get rid of dizziness, performing ADL's         Objective     Concurrent Complaints  Positive for headaches, tinnitus, visual change and poor concentration  Negative for nausea/motion sickness, hearing loss, memory loss, aural fullness and peripheral neuropathy    Active Range of Motion   Cervical/Thoracic Spine       Cervical    Flexion: Neck active flexion: WNL all palnes    Neuro Exam:     Dizziness  Positive for disequilibrium, vertigo, motion sickness, light-headedness, diplopia and floating or swimming  Negative for oscillopsia and rocking or swaying   " Exacerbating factors  Positive for bending over, rolling in bed, looking up, walking, turning head and optokinetic movement  Negative for supine to/from sitting and walking in busy environment  Symptoms   Duration: 4 months   Frequency: constantly, more often than not   Intensity at best: 3/10  Intensity at worst: 10/10  Average intensity: 6/10    Headaches   Patient reports headaches: Yes     Frequency: every day   Duration: hours-day   Intensity at best: 0/10  Intensity at worst: 10/10  Average intensity: 3/10  Location: top of head   Exacerbating factors: not sure   Relieving factors: time     Cervical exam   Modified VBI   Left: asymptomatic  Right: asymptomatic    Oculomotor exam   Oculomotor ROM: WNL  Resting nystagmus: not present   Gaze holding nystagmus: present left  Gaze holding nystagmus: not present right  Smooth pursuits: within normal limits  Vertical saccades: normal  Horizontal saccades: normal  Convergence: abnormal  Cover test: normal  Head thrust: left normal and right normal    EO, Feet together- 30 sec  EC, Feet together- 30 sec   EO, Feet together on foam- 30 sec   EC, Feet together on foam- 8 sec         Re-eval Date: 4/30    Date 3/30       Visit Count 1       FOTO        Pain In        Pain Out             Precautions none       Manuals                                        Neuro Re-Ed         Amb with HT/HN         Pencil push ups         Dynavision         Static balance EC foam         Education on prognosis/diagnosis 10 min                        Ther Ex                                                                        Ther Activity                        Gait Training                        Modalities

## 2023-03-30 NOTE — PSYCH
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  Due to work, pt would be gone for several weeks at a time, which contributed to stressors in the marriage  Pt  Now works for a [employer] and is home more often, but still in debt due to his former company closing  PT admits that some poor business decisions had contributed to the business closure  Pt also admits to some other stressors in the marriage including viewing pornograpy, and being four months behind in their rent for their residence  Pt has noticed over the past few weeks that his wife has been texting someone , but has been secretive about whom she was texting  When confronted, the wife admits that she was texting a former therapist  This evening the wife was texting again to the therapist and around midnight, there was a phone call from Ishmael Ordoñez" on the wife's phone  The pt was about to answer his wife's phone, but the wife stated that this was the title she put in her phone for the former therapist  The pt's wife suffers with depression  There are four children, the oldest being [de-identified], and the youngest being born in Nov 2022  The pt  Worries about the children and his wife being alone all day, and having no services  The wife suffers from depression  Pt fears that they will end in divorce, but would like to salvage their marriage   As of this time, the pt is requesting out patient services with a psych and therapist  These appointments have been set up for March 30, at 1 PM with Psych and 2 PM with the " "therapist  Previous Psychiatric/psychological treatment/year: Psychiatrist in [place] - depression, anxiety and panic disorder    Current Psychiatrist/Therapist: Sera Ferrari   Outpatient and/or Partial and Other [employer] Used ([facility], [facility], [facility]): Inpatient when he was a teenager for cutting  and history of outpatient       Problem Assessment:     SOCIAL/VOCATION:  Family Constellation (include parents, relationship with each and pertinent Psych/Medical History):     No family history on file  Mother:  in 36 from a sickness  Spouse: wife - Riley Ege - \"chance relationship\" \"we have talked about divorce\"   Father: \"ok relationship\" lives 40 minutes away \"more friends than father/son relationship\"   Children: 4 children (son 5 in May, daughter 9, daughter 10, daughter 10 months old)  Sibling: sister - does not talk too much, she isn't good at getting back to anyone  Siblin half brothers - older brother \"we talk and have a better relationship than my sister\" the younger one I haven't seen since he is an infant and he will be 16      Lidya Lamine relates best to \"I'm a loner\"  he lives with wife and kids  he does not live alone  Domestic Violence: No past history of domestic violence    Additional Comments related to family/relationships/peer support: has some friends, best friends's ex-wife is a big support  School or Work History (strengths/limitations/needs):  Drives train trucks     Her highest grade level achieved was diploma      history includes none     Financial status includes works full time, medicaid insurance     LEISURE ASSESSMENT (Include past and present hobbies/interests and level of involvement (Ex: Group/Club Affiliations): video games, tv, music   his primary language is [place]  Preferred language is [place]  Ethnic considerations are none  Religions affiliations and level of involvement none   Does spirituality help you cope?  No    FUNCTIONAL STATUS: There has been a " recent change in Iban ability to do the following: [facility]    Level of Assistance Needed/By Whom?: self    Iban learns best by  demonstration    SUBSTANCE ABUSE ASSESSMENT: no substance abuse    Substance/Route/Age/Amount/Frequency/Last Use: none    DETOX HISTORY: none    Previous detox/rehab treatment: no    HEALTH ASSESSMENT: PCP not notified     LEGAL: No Mental Health Advance Directive or Power of  on file    Prenatal History: N/A    Delivery History: N/A    Developmental Milestones: N/A  Temperament as an infant was N/A  Temperament as a toddler was N/A  Temperament at school age was normal   Temperament as a teenager was normal     Risk Assessment:   The following ratings are based on my interview(s) with Félix Higgins of Harm to Self:   Demographic risk factors include  and male  Historical Risk Factors include a relative or close friend who  by suicide, chronic psychiatric problems, history of suicidal behaviors/attempts and self-mutilating behaviors  Recent Specific Risk Factors include diagnosis of depression   Additional Factors for a Child or Adolescent adult    Risk of Harm to Others:   Demographic Risk Factors include male  Historical Risk Factors include victim of childhood bullying  Recent Specific Risk Factors include multiple stressors    Access to Weapons:   Iban has access to the following weapons: 1 gun   The following steps have been taken to ensure weapons are properly secured: lock box     Based on the above information, the client presents the following risk of harm to self or others:  low    The following interventions are recommended:   no intervention changes    Notes regarding this Risk Assessment: Has protective factors of a job, family unit, and coping skills         Review Of Systems:     Mood Normal   Behavior Normal    Thought Content Normal   General Relationship Problems and Emotional Problems   Personality Normal   Other Psych Symptoms Normal   Constitutional As Noted in HPI   ENT As Noted in HPI   Cardiovascular As Noted in HPI   Respiratory As Noted in HPI   Gastrointestinal As Noted in HPI   Genitourinary As Noted in HPI   Musculoskeletal As Noted in HPI   Integumentary As Noted in HPI   Neurological As Noted in HPI   Endocrine As Noted in HPI         Mental status:  Appearance calm and cooperative , poor hygiene /disheveled and good eye contact    Mood mood appropriate   Affect affect was flat   Speech a normal rate, speech soft and fluent   Thought Processes normal thought processes   Hallucinations no hallucinations present    Thought Content no delusions   Abnormal Thoughts no suicidal thoughts  and no homicidal thoughts    Orientation  oriented to person, oriented to place and oriented to time   Remote Memory short term memory intact and long term memory intact   Attention Span concentration intact   Intellect Not [address] of Knowledge displays adequate knowledge of current events, adequate fund of knowledge regarding past history and adequate fund of knowledge regarding vocabulary    Insight Limited insight   Judgement judgment was limited   Muscle Strength Muscle strength and tone were normal and Normal gait    Language no difficulty naming common objects, no difficulty repeating a phrase  and no difficulty writing a sentence    Pain none   Pain Scale 6     Visit start and stop times:    03/30/23

## 2023-03-30 NOTE — BH TREATMENT PLAN
TREATMENT PLAN (Medication Management Only)        [facility]    Name and Date of Birth:  Anahi Comer III 29 y o  1990  Date of Treatment Plan: March 30, 2023  Diagnosis/Diagnoses:    1  Generalized anxiety disorder    2  Major depressive disorder, recurrent, moderate (HCC)      Strengths/Personal Resources for Self-Care: ability to adapt to life changes  Area/Areas of need (in own words): anxiety symptoms, and depression symptoms  1  Long Term Goal: improve anxiety  Target Date:6 months - 9/30/2023  Person/Persons responsible for completion of goal: Windy Fan  2  Short Term Objective (s) - How will we reach this goal?:   A  Provider new recommended medication/dosage changes and/or continue medication(s): continue current medications as prescribed Lexapro, propranolol  B  N/A   C  N/A  Target Date:6 months - 9/30/2023  Person/Persons Responsible for Completion of Goal: Windy Fan   Progress Towards Goals: starting treatment  Treatment Modality: medication management every 4 weeks  Review due 180 days from date of this plan: 6 months - 9/30/2023  Expected length of service: ongoing treatment  My Physician/PA/NP and I have developed this plan together and I agree to work on the goals and objectives  I understand the treatment goals that were developed for my treatment
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
## 2023-03-31 ENCOUNTER — TELEPHONE (OUTPATIENT)
Dept: PSYCHIATRY | Facility: CLINIC | Age: 33
End: 2023-03-31

## 2023-03-31 DIAGNOSIS — F41.1 GENERALIZED ANXIETY DISORDER: ICD-10-CM

## 2023-03-31 NOTE — TELEPHONE ENCOUNTER
"Telephone call to PT following up from [facility] visit  PT reports \"doing good  \" PT was able to  prescribed medication from the pharmacy today  PT reports that he is to follow up in 4 weeks for medication management  Referral sent to [facility] to schedule new patient appointment for medication management  MIKA LY explained that someone will follow up from [facility] to schedule appointment  PT thanked MIKA LY for following up and denies needing anything else at this time         "

## 2023-04-19 ENCOUNTER — TELEMEDICINE (OUTPATIENT)
Dept: BEHAVIORAL/MENTAL HEALTH CLINIC | Facility: CLINIC | Age: 33
End: 2023-04-19

## 2023-04-19 DIAGNOSIS — F33.0 MILD EPISODE OF RECURRENT MAJOR DEPRESSIVE DISORDER (HCC): ICD-10-CM

## 2023-04-19 DIAGNOSIS — F41.1 GENERALIZED ANXIETY DISORDER: Primary | ICD-10-CM

## 2023-04-19 NOTE — PSYCH
Virtual Regular Visit    Verification of patient location:    Patient is located at Home in the following state in which I hold an active license [place]      Assessment/Plan:    Problem List Items Addressed This Visit    None  Visit Diagnoses     Generalized anxiety disorder    -  Primary    Mild episode of recurrent major depressive disorder ([facility] [address] )              Goals addressed in session: Goal 1          Reason for visit is No chief complaint on file  Encounter provider Yisel Go    Provider located at [address] [place] [address] [address] [facility] [address] [phone]      Recent Visits  No visits were found meeting these conditions  Showing recent visits within past 7 days and meeting all other requirements  Today's Visits  Date Type Provider Dept   04/19/23 Telemedicine Yisel Horse [facility]   Showing today's visits and meeting all other requirements  Future Appointments  No visits were found meeting these conditions  Showing future appointments within next 150 days and meeting all other requirements       The patient was identified by name and date of birth  Jonah Akins III was informed that this is a telemedicine visit and that the visit is being conducted [facility]  He agrees to proceed     My office door was closed  No one else was in the room  He acknowledged consent and understanding of privacy and security of the video platform  The patient has agreed to participate and understands they can discontinue the visit at any time  Patient is aware this is a billable service  Subjective  Jonah Akins III is a 29 y o  male   Session began with open ended questions to gain feedback on mood  Matheus Singh expressed that he has been struggling with an increase in anxiety  Processed what he felt this meant to him    He stated he feels physical symptoms such as chest "tightness and trouble breathing  He processed the first time he ever felt this way which was a time when he was on the road for [place] and he was eating a \"big\" breakfast  He stated that he had chest tightness and felt he was having a heart attack  He was evaluated by an ER and everything came back normal  Discussed triggers to anxiety and Efraín Earl focused highly on physical health  He expressed that he wishes there was something wrong with him and it would put his mind at ease because he would have answers because he does not feel what he experiences is anxiety  Session ended with psychoeducation on CBT and mindfulness  Efraín Jaylazen will take not of triggers and times that he experiences his anxiety attacks  HPI     No past medical history on file  No past surgical history on file  Current Outpatient Medications   Medication Sig Dispense Refill   • busPIRone (BUSPAR) 15 mg tablet Take 15 mg by mouth 2 (two) times a day     • escitalopram (Lexapro) 10 mg tablet Take 1 tablet (10 mg total) by mouth daily 30 tablet 0   • Humira Pen 40 MG/0 4ML PNKT      • pantoprazole (PROTONIX) 40 mg tablet Take 1 tablet (40 mg total) by mouth daily for 14 days 14 tablet 0   • propranolol (INDERAL) 10 mg tablet Take 1 tablet (10 mg total) by mouth if needed (as needed) 30 tablet 0   • rosuvastatin (CRESTOR) 10 MG tablet Take 10 mg by mouth daily     • thiamine 100 MG tablet Take 100 mg by mouth daily       No current facility-administered medications for this visit  No Known Allergies    Review of Systems    Video Exam    There were no vitals filed for this visit      Physical Exam     Visit Time    Visit Start Time: 9895  Visit Stop Time: 6322  Total Visit Duration: 50 minutes      "

## 2023-04-25 ENCOUNTER — TELEMEDICINE (OUTPATIENT)
Dept: BEHAVIORAL/MENTAL HEALTH CLINIC | Facility: CLINIC | Age: 33
End: 2023-04-25

## 2023-04-25 DIAGNOSIS — F41.1 GENERALIZED ANXIETY DISORDER: Primary | ICD-10-CM

## 2023-04-25 DIAGNOSIS — F33.0 MILD EPISODE OF RECURRENT MAJOR DEPRESSIVE DISORDER (HCC): ICD-10-CM

## 2023-04-25 NOTE — PSYCH
Virtual Regular Visit    Verification of patient location:    Patient is located at Home in the following state in which I hold an active license [place]      Assessment/Plan:    Problem List Items Addressed This Visit    None  Visit Diagnoses     Generalized anxiety disorder    -  Primary    Mild episode of recurrent major depressive disorder ([facility] [address] )              Goals addressed in session: Goal 1          Reason for visit is No chief complaint on file  Encounter provider Genna Ramirez    Provider located at [address] [place] [address] [address] [facility] [address] [phone]      Recent Visits  Date Type Provider Dept   04/19/23 [address] [facility]   Showing recent visits within past 7 days and meeting all other requirements  Today's Visits  Date Type Provider Dept   04/25/23 Telemedicine Genna Ramirez [facility]   Showing today's visits and meeting all other requirements  Future Appointments  No visits were found meeting these conditions  Showing future appointments within next 150 days and meeting all other requirements       The patient was identified by name and date of birth  Sarah Leal III was informed that this is a telemedicine visit and [facility]  He agrees to proceed     My office door was closed  No one else was in the room  He acknowledged consent and understanding of privacy and security of the video platform  The patient has agreed to participate and understands they can discontinue the visit at any time  Patient is aware this is a billable service  Subjective  Sarah Leal III is a 29 y o  male    Session began with open ended questions to gain feedback on mood  He reported having a positive last 2 days where his family had a yard sale to earn money   Session then focused on his homework assignment which was to log his panic attacks and triggers to the events  He logged about 2-3 days of anxious times and expressed he lost track of doing so  There were not many consistencies within his log; however, some had to do with walking or any type of movement  Discussed his anxiety related to medical issues and not having a diagnosis  Session ended with psychoeducaiton on anxiety and obsessive thoughts  HPI     No past medical history on file  No past surgical history on file  Current Outpatient Medications   Medication Sig Dispense Refill    busPIRone (BUSPAR) 15 mg tablet Take 15 mg by mouth 2 (two) times a day      escitalopram (Lexapro) 10 mg tablet Take 1 tablet (10 mg total) by mouth daily 30 tablet 0    Humira Pen 40 MG/0 4ML PNKT       pantoprazole (PROTONIX) 40 mg tablet Take 1 tablet (40 mg total) by mouth daily for 14 days 14 tablet 0    propranolol (INDERAL) 10 mg tablet Take 1 tablet (10 mg total) by mouth if needed (as needed) 30 tablet 0    rosuvastatin (CRESTOR) 10 MG tablet Take 10 mg by mouth daily      thiamine 100 MG tablet Take 100 mg by mouth daily       No current facility-administered medications for this visit  No Known Allergies    Review of Systems    Video Exam    There were no vitals filed for this visit      Physical Exam     Visit Time    Visit Start Time: 3062  Visit Stop Time: 9083  Total Visit Duration: 50 minutes

## 2023-04-26 ENCOUNTER — OFFICE VISIT (OUTPATIENT)
Dept: PSYCHIATRY | Facility: CLINIC | Age: 33
End: 2023-04-26

## 2023-04-26 DIAGNOSIS — F41.1 GAD (GENERALIZED ANXIETY DISORDER): ICD-10-CM

## 2023-04-26 DIAGNOSIS — F41.0 PANIC DISORDER: ICD-10-CM

## 2023-04-26 DIAGNOSIS — F33.1 MODERATE RECURRENT MAJOR DEPRESSION (HCC): Primary | ICD-10-CM

## 2023-04-26 PROBLEM — G47.33 OSA ON CPAP: Status: ACTIVE | Noted: 2023-03-27

## 2023-04-26 PROBLEM — Z99.89 OSA ON CPAP: Status: ACTIVE | Noted: 2023-03-27

## 2023-04-26 PROBLEM — L40.9 PSORIASIS: Status: ACTIVE | Noted: 2023-02-09

## 2023-04-26 PROBLEM — K21.9 GASTROESOPHAGEAL REFLUX DISEASE: Status: ACTIVE | Noted: 2023-02-09

## 2023-04-26 RX ORDER — BUSPIRONE HYDROCHLORIDE 15 MG/1
15 TABLET ORAL 2 TIMES DAILY
Qty: 60 TABLET | Refills: 2 | Status: SHIPPED | OUTPATIENT
Start: 2023-04-26

## 2023-04-26 RX ORDER — PAROXETINE HYDROCHLORIDE 20 MG/1
20 TABLET, FILM COATED ORAL EVERY MORNING
Qty: 30 TABLET | Refills: 0 | Status: SHIPPED | OUTPATIENT
Start: 2023-04-26

## 2023-04-26 NOTE — PSYCH
"  Outpatient Psychiatry Intake Exam   [facility]  [address] [address] [phone]      PCP: Ken oJhn DO        Identifying information:  Gonzalo Teran III is a 29 y o  male with a history of depression and anxiety here for evaluation and determination of mental health management needs  Sources of information:   Information for this evaluation was gathered through direct interview with the patient  Additionally EMR was reviewed  Confidentiality discussion: Limits of confidentiality in cases of safety concerns involving self and others as well as this physician's role as a mandatory  of abuse  They voiced understanding and a desire to continue with the evaluation  Seen with HARSH Siegel with pt's permission     SUBJECTIVE     Chief Complaint / reason for visit: \"Bad anxiety\"     History of Present Illness:      28 yo  male, 4 children, with hx of depression and anxiety referred by walk-in center  Is currently seeing Ines for ind therapy  Was seen by [facility] 3/30/23 at which time lexapro restarted at 10 mg/d  Had some limited success with this is in the past but could not tolerate 20 mg dose  Buspar was continued  Inderal was started but Ish Smiling stopped after one dose- \"felt like my heart was slowing down; drained feeling\"  Notes no change with restart of lexapro  Ishparam Tsai reports panic attacks since May 2019, started while he was on the road (vir[place])  Was [facility] to [facility] March of this year for dizziness and passing out but states he had neg work-up  Was found to have lateral nystagmus and referred to PT but has not been able to f/u with them yet  Has appt with neurology [facility]- 7/27/23)            Ish Smiling reports spontaneous onset panic accompanied by chest tightness, shortness of breath,feels heart beating, heightened body sensations; \"feels like something hanging on me, " "pulling me down\"; need to run; fear of having another panic attack  Anxious to leave house but is able to do so  Has felt depressed since pre-teens  Currently experiencing hopelessness, feels like a failure; anhedonia  Rates depressed mood as 7/10  Denies SI  Reports recurrent thoughts of the past; nightmares about 1-2x/month, apprehension, avoidance of people/places that remind him of past trauma  Is awakened from sleep with panic about 1-2x/month  Jose Rafael Clayton says he overeats when stressed and bored  States he wasn't fed when he was younger and when he was \"I had to clean my plate\"  In high school Jose Rafael Clayton weighed 140 lbs  Current weight is about 380 lbs  Highest weight was Anabel Lick of this year- 430 lbs  Past Psychiatric History    Inpatient:  X one -teen  Attempted to hang self as a teen- rope smiley  Outpatient:  As a teen in foster care system; heavily medicated  Currently sees Maikol Choudhury for ind therapy  Trauma/Loss History:     Bio mother  12 yo  Very strained relationship with step-mom  Placed in residential care and then foster care  Suicide attempt while in foster care  Family Psychiatric History:     Psychiatric Illness:  unknown  Substance Abuse:  unknown  Suicide Attempts:  unknown    Social History:             to wife Magaly Tyson  Strained relationship   4 children  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  Hx of IEP since 4th grade  Past Medical / Surgical History:    Current PCP: Brneda Olsen DO    Allergies:   Adhesive tape    Past Medical History:  MILTON  Psoriasis    Past Surgical History:  No past surgical history on file  Recent labs:  I have reviewed all pertinent labs  Lab Results   Component Value Date    HGBA1C 5 7 (H) 2023     3/21/23: TSH 4 04, H&H 12  3, BMP -WNL  3/20/23: CMP- WNL          Medical Review Of Systems:    Review of Systems   Eyes:        Hx of horizontal nystagmus " Musculoskeletal: Positive for arthralgias  Psychiatric/Behavioral: Positive for sleep disturbance  MILTON              Objective     OBJECTIVE       MENTAL STATUS EXAM  Appearance:  age appropriate, dressed casually; horizontal nystagmus at extreme lateral field   Behavior:  Pleasant & cooperative   Speech:  Normal volume, regular rate and rhythm   Mood:  anxious   Affect:  mood congruent   Language: intact and appropriate for age, education, and intellect   Thought Process:  goal directed   Associations: intact associations   Thought Content:  negative ruminations   Perceptual Disturbances: no auditory or visual hallcunations   Risk Potential / Abnormal Thoughts: Suicidal ideation - None  Homicidal ideation - None  Potential for aggression - No       Consciousness:  Alert & Awake   Sensorium:  Grossly oriented   Attention: attention span and concentration are age appropriate   Intellect: within normal limits   Fund of Knowledge:  Memory: awareness of current events: yes  recent and remote memory grossly intact   Insight:  good   Judgment: good   Muscle Strength Muscle Tone: Grossly normal  normal   Gait/Station: normal gait/station with good balance   Motor Activity: no abnormal movements         ASSESSMENT & PLAN          Diagnoses and all orders for this visit:    Moderate recurrent major depression (HCC)    Panic disorder  -     PARoxetine (PAXIL) 20 mg tablet; Take 1 tablet (20 mg total) by mouth every morning    TONIE (generalized anxiety disorder)  -     busPIRone (BUSPAR) 15 mg tablet;  Take 1 tablet (15 mg total) by mouth 2 (two) times a day      Current Outpatient Medications   Medication Sig Dispense Refill    busPIRone (BUSPAR) 15 mg tablet Take 1 tablet (15 mg total) by mouth 2 (two) times a day 60 tablet 2    PARoxetine (PAXIL) 20 mg tablet Take 1 tablet (20 mg total) by mouth every morning 30 tablet 0    Humira Pen 40 MG/0 4ML PNKT       pantoprazole (PROTONIX) 40 mg tablet Take 1 tablet (40 mg total) by mouth daily for 14 days 14 tablet 0    thiamine 100 MG tablet Take 100 mg by mouth daily       No current facility-administered medications for this visit  Plan:        Not responding to retrial of lexapro or trial inderal   Cannot tolerate lexapro at 20 mg  Will cont buspar (rx'd by PCP) at 15 mg bid and give trial paxil 20 mg q am       Reviewed risks, benefits, side effects of medications, including no medication  Patient understands and agrees to treatment plan  Cont f/u with Ines for ind therapy  F/u RAMESH 5/17/23, sooner prn    Patient has been informed of 24 hours and weekend coverage for urgent situations accessed by calling the main clinic phone number        Shelly White PA-C   Visit Time    Visit Start Time: 8192  Visit Stop Time: 5574  Total Visit Duration: 35 minutes

## 2023-05-02 ENCOUNTER — TELEMEDICINE (OUTPATIENT)
Dept: BEHAVIORAL/MENTAL HEALTH CLINIC | Facility: CLINIC | Age: 33
End: 2023-05-02

## 2023-05-02 DIAGNOSIS — F41.1 GENERALIZED ANXIETY DISORDER: Primary | ICD-10-CM

## 2023-05-02 DIAGNOSIS — F33.0 MILD EPISODE OF RECURRENT MAJOR DEPRESSIVE DISORDER (HCC): ICD-10-CM

## 2023-05-02 NOTE — PSYCH
Virtual Regular Visit    Verification of patient location:    Patient is located at Home in the following state in which I hold an active license [place]      Assessment/Plan:    Problem List Items Addressed This Visit    None  Visit Diagnoses     Generalized anxiety disorder    -  Primary    Mild episode of recurrent major depressive disorder ([facility] [address] )              Goals addressed in session: Goal 1          Reason for visit is No chief complaint on file  Encounter provider Faheem Wang    Provider located at [address] [place] [address] [address] [facility] [address] [phone]      Recent Visits  Date Type Provider Dept   04/25/23 [address] [facility]rap[facility] [place]   Showing recent visits within past 7 days and meeting all other requirements  Today's Visits  Date Type Provider Dept   05/02/23 Telemedicine Faheem Wang [facility] Therapist [place]   Showing today's visits and meeting all other requirements  Future Appointments  No visits were found meeting these conditions  Showing future appointments within next 150 days and meeting all other requirements       The patient was identified by name and date of birth  Asif Dash III was informed that this is a telemedicine visit and [facility]  He agrees to proceed     My office door was closed  No one else was in the room  He acknowledged consent and understanding of privacy and security of the video platform  The patient has agreed to participate and understands they can discontinue the visit at any time  Patient is aware this is a billable service  Subjective  Asif Dash III is a 29 y o  male  Session began with open ended questions to gain feedback on mood  Odettehardeep Tse reported having a positive week and feeling like he is doing well with the switch to Paxil   He reported a significant decrease in anxiety/panic attacks  He reported 1 major panic attack and with processing he reported that he has been overeating when stressed  He expressed he ordered a medium pizza from [email or web address] and ate the whole pizza  While driving he then felt tightness in his chest and began with labored breathing  He reported he began counting licence plates and it was helpful  Discussed anxiety vs GERD and he was agreeable but then stated he believes it is anxiety  He reported that 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 which he feels is helping decrease his anxiety knowing that he will have pay coming in  Session then focused on processing stressors within his relationship  He expressed he wife has struggled with addiction within the last few years and he is recognizing traits that he has seen in the past when she was using  Discussed challenges he faces internally with his decision making in regards to staying in his marriage  Session ended with processing grounding and relaxation techniques  HPI     No past medical history on file  No past surgical history on file  Current Outpatient Medications   Medication Sig Dispense Refill    busPIRone (BUSPAR) 15 mg tablet Take 1 tablet (15 mg total) by mouth 2 (two) times a day 60 tablet 2    Humira Pen 40 MG/0 4ML PNKT       pantoprazole (PROTONIX) 40 mg tablet Take 1 tablet (40 mg total) by mouth daily for 14 days 14 tablet 0    PARoxetine (PAXIL) 20 mg tablet Take 1 tablet (20 mg total) by mouth every morning 30 tablet 0    thiamine 100 MG tablet Take 100 mg by mouth daily       No current facility-administered medications for this visit  No Known Allergies    Review of Systems    Video Exam    There were no vitals filed for this visit      Physical Exam     Visit Time    Visit Start Time: 8131  Visit Stop Time: 6881  Total Visit Duration: 50 minutes

## 2023-05-08 ENCOUNTER — TELEMEDICINE (OUTPATIENT)
Dept: BEHAVIORAL/MENTAL HEALTH CLINIC | Facility: CLINIC | Age: 33
End: 2023-05-08

## 2023-05-08 DIAGNOSIS — F41.1 GENERALIZED ANXIETY DISORDER: Primary | ICD-10-CM

## 2023-05-09 NOTE — PSYCH
Virtual Regular Visit    Verification of patient location:    Patient is located at Home in the following state in which I hold an active license [place]      Assessment/Plan:    Problem List Items Addressed This Visit    None  Visit Diagnoses     Generalized anxiety disorder    -  Primary          Goals addressed in session: Goal 1          Reason for visit is No chief complaint on file  Encounter provider [facility]    Provider located at [address] [place] [address] [address] [facility] [address] [phone]      Recent Visits  Date Type Provider Dept   05/08/23 Telemedicine [facility] Therapist [place]   05/02/23 Telemedicine [facility] Therapist Ángel   Showing recent visits within past 7 days and meeting all other requirements  Future Appointments  No visits were found meeting these conditions  Showing future appointments within next 150 days and meeting all other requirements       The patient was identified by name and date of birth  Jonh Yee III was informed that this is a telemedicine visit and that [facility]  He agrees to proceed     My office door was closed  No one else was in the room  He acknowledged consent and understanding of privacy and security of the video platform  The patient has agreed to participate and understands they can discontinue the visit at any time  Patient is aware this is a billable service  Subjective  Jonh Yee III is a 29 y o  male    HPI     No past medical history on file  No past surgical history on file      Current Outpatient Medications   Medication Sig Dispense Refill   • busPIRone (BUSPAR) 15 mg tablet Take 1 tablet (15 mg total) by mouth 2 (two) times a day 60 tablet 2   • Humira Pen 40 MG/0 4ML PNKT      • pantoprazole (PROTONIX) 40 mg tablet Take 1 "tablet (40 mg total) by mouth daily for 14 days 14 tablet 0   • PARoxetine (PAXIL) 20 mg tablet Take 1 tablet (20 mg total) by mouth every morning 30 tablet 0   • thiamine 100 MG tablet Take 100 mg by mouth daily       No current facility-administered medications for this visit  No Known Allergies    Review of Systems    Video Exam    There were no vitals filed for this visit  Physical Exam       Behavioral Health Psychotherapy Progress Note    Psychotherapy Provided: Individual Psychotherapy     1  Generalized anxiety disorder            Goals addressed in session: Goal 1     DATA: Met with Ramos Montero  Session began with open ended questions to gain feedback on mood  Ramos Montero reported having an increase in \"episodes\" this week  He reports feeling that he is having more panic attacks than he was the week prior  He continues to report that they are hard to track and do not neccessarily have a trigger  However, he was able to identify that he continues to overeat when he feels that he does not know what else to do  He continues to describe his episodes as chest tightness, trouble breathing and feeling lightheaded  He expressed this symptoms increase when he is in public because he is fearful of passing out in a public setting  Discussed grounding and provided him with homework to [employer] mindfulness and grounding techniques to practice  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  He expressed he was the provider of the family, but now that his wife is doing the work for the family he fears that she will leave him  Discussed negative thought process and as a trigger to anxiety related symptoms  During this session, this clinician used the following therapeutic modalities: Cognitive Behavioral Therapy, Mindfulness-based Strategies and Solution-Focused Therapy    Substance Abuse was not addressed during this session   If the client is diagnosed with a co-occurring substance use disorder, " "please indicate any changes in the frequency or amount of use: N/A  Stage of change for addressing substance use diagnoses: No substance use/Not applicable    ASSESSMENT:  Coty Marin III presents with a Euthymic/ normal mood  his affect is Flat, which is congruent, with his mood and the content of the session  The client has made progress on their goals  Lawanda Cruz presents with a none risk of suicide, none risk of self-harm, and none risk of harm to others  For any risk assessment that surpasses a \"low\" rating, a safety plan must be developed  A safety plan was indicated: no  If yes, describe in detail N/A    PLAN: Between sessions, Lawanda Cruz will practice grounding and coping skills  At the next session, the therapist will use Cognitive Behavioral Therapy and Mindfulness-based Strategies to address anxiety related symptoms  Behavioral Health Treatment Plan and Discharge Planning: Coty Marin III is aware of and agrees to continue to work on their treatment plan  They have identified and are working toward their discharge goals   yes    Visit start and stop times:    05/08/23  Start Time: 1300  Stop Time: 1350  Total Visit Time: 50 minutes  "

## 2023-05-17 ENCOUNTER — TELEMEDICINE (OUTPATIENT)
Dept: PSYCHIATRY | Facility: CLINIC | Age: 33
End: 2023-05-17

## 2023-05-17 DIAGNOSIS — F41.1 GAD (GENERALIZED ANXIETY DISORDER): ICD-10-CM

## 2023-05-17 DIAGNOSIS — F41.0 PANIC DISORDER: ICD-10-CM

## 2023-05-17 DIAGNOSIS — F33.1 MODERATE RECURRENT MAJOR DEPRESSION (HCC): Primary | ICD-10-CM

## 2023-05-17 RX ORDER — PAROXETINE HYDROCHLORIDE 20 MG/1
20 TABLET, FILM COATED ORAL EVERY MORNING
Qty: 30 TABLET | Refills: 0 | Status: SHIPPED | OUTPATIENT
Start: 2023-05-17

## 2023-05-17 RX ORDER — PAROXETINE 10 MG/1
10 TABLET, FILM COATED ORAL DAILY
Qty: 30 TABLET | Refills: 0 | Status: SHIPPED | OUTPATIENT
Start: 2023-05-17

## 2023-05-17 NOTE — PSYCH
Virtual Regular Visit    Verification of patient location:    Patient is located at Home in the following state in which I hold an active license [place]             Reason for visit is   Chief Complaint   Patient presents with   • Virtual Regular Visit        Encounter provider Jerri Earl PA-C    Provider located at  [address] [place] [address]  [address] [facility] [address] [phone]      Recent Visits  No visits were found meeting these conditions  Showing recent visits within past 7 days and meeting all other requirements  Today's Visits  Date Type Provider Dept   05/17/23 Telemedicine Jerri Earl PA-C [facility] [address] today's visits and meeting all other requirements  Future Appointments  No visits were found meeting these conditions  Showing future appointments within next 150 days and meeting all other requirements       The patient was identified by name and date of birth  Carlos Carson III was informed that this is a telemedicine visit and [facility] the [facility]  He agrees to proceed     My office door was closed  The patient was notified the following individuals were present in the room HARSH Castañeda  He acknowledged consent and understanding of privacy and security of the video platform  The patient has agreed to participate and understands they can discontinue the visit at any time  Patient is aware this is a billable service                  Visit Time    Visit Start Time: 7752  Visit Stop Time: 4400  Total Visit Duration: 18 minutes       MEDICATION MANAGEMENT NOTE        [address] [facility] [address] [address] [phone]        Name and Date of Birth:  Carlos Carson III 29 y o  1990    Date of Visit: May 17, "    SUBJECTIVE:    Cindy Pérez seen for initial eval  at which time lexapro stopped, paxil and buspar started  Cindy Pérez noted decrease in panic symptoms first week after starting but pretty much back to baseline panic since then  Panic exacerbated by exertion (has been medically evaluated by PCP and cardiology) and spontaneous in onset and occurs about 3x/d accompanied by chest tightness,shortness of breath, heart racing, feels like he is having a heart attack, generalized weakness- \"can't move\"  Occur mostly between 12-4 pm  Rates depressed mood as 5/10 accompanied by low motivation, overeating, weight gain  Denies irritability  States overeating driven by emotion - temporarily feels \"fulfilled\" - overeats on unhealthy food  Describes 17 lbs weight gain since last visit  Review of Systems   Constitutional: Positive for appetite change and unexpected weight change  Psychiatric/Behavioral:        Uses CPAP       Past Psychiatric History     Inpatient:  X one -teen  Attempted to hang self as a teen- blade reis  Outpatient:  As a teen in foster care system; heavily medicated  Currently sees Helen Maza for ind therapy  This office since 25  Trauma/Loss History:     Bio mother  12 yo  Very strained relationship with step-mom  Placed in residential care and then foster care  Suicide attempt while in foster care  Family Psychiatric History:      Psychiatric Illness:      unknown  Substance Abuse:       unknown  Suicide Attempts:        unknown     Social History:             to wife Janis Ready  Strained relationship   4 children  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  Hx of IEP since 4th grade               OBJECTIVE:     MENTAL STATUS EXAM  Appearance:  age appropriate   Behavior:  Pleasant & cooperative   Speech:  Normal volume, regular rate and rhythm   Mood:  anxious   Affect:  mood congruent   Language: intact and " appropriate for age, education, and intellect   Thought Process:  goal directed   Associations: intact associations   Thought Content:  negative thinking and cognitive distortions   Perceptual Disturbances: no auditory or visual hallcunations   Risk Potential / Abnormal Thoughts: Suicidal ideation - None at present  Homicidal ideation - None at present  Potential for aggression - No       Consciousness:  Alert & Awake   Sensorium:  Grossly oriented   Attention: attention span and concentration are age appropriate       Fund of Knowledge:  Memory: awareness of current events: yes  recent and remote memory grossly intact   Insight:  intact   Judgment: intact       Lab Review: I have reviewed all pertinent labs  Lab Results   Component Value Date    HGBA1C 5 7 (H) 02/16/2023 2/16/23: Trig 166, HDL 27  3/21/23: TSH 4 04, H&H 12 8/39 3  3/20/23: CMP -WNL      ASSESSMENT & PLAN          Diagnoses and all orders for this visit:    Moderate recurrent major depression (HCC)  -     PARoxetine (PAXIL) 10 mg tablet; Take 1 tablet (10 mg total) by mouth daily At noon    Panic disorder  -     PARoxetine (PAXIL) 20 mg tablet; Take 1 tablet (20 mg total) by mouth every morning  -     PARoxetine (PAXIL) 10 mg tablet; Take 1 tablet (10 mg total) by mouth daily At noon    TONIE (generalized anxiety disorder)  -     PARoxetine (PAXIL) 10 mg tablet;  Take 1 tablet (10 mg total) by mouth daily At noon      Current Outpatient Medications   Medication Sig Dispense Refill   • PARoxetine (PAXIL) 10 mg tablet Take 1 tablet (10 mg total) by mouth daily At noon 30 tablet 0   • PARoxetine (PAXIL) 20 mg tablet Take 1 tablet (20 mg total) by mouth every morning 30 tablet 0   • busPIRone (BUSPAR) 15 mg tablet Take 1 tablet (15 mg total) by mouth 2 (two) times a day 60 tablet 2   • Humira Pen 40 MG/0 4ML PNKT      • pantoprazole (PROTONIX) 40 mg tablet Take 1 tablet (40 mg total) by mouth daily for 14 days 14 tablet 0     No current facility-administered medications for this visit  Plan:         Given hx, unclear if paxil associated with weight gain at this dose and for this amount of time (2-3 weeks)  Did have initial benefit so will increase to paxil 20/10 mg now- 20 mg in am and 10 mg q noon  Will cont buspar 15 mg bid  Reviewed risks, benefits, side effects of medications, including no medication  Patient understands and agrees to treatment plan  F/u PaALESSANDRO 2 weeks, sooner prn        Cont f/u with Ines for ind therapy  Patient has been informed of 24 hours and weekend coverage for urgent situations accessed by calling the main clinic phone number       Adrienne Garcia PA-C

## 2023-05-25 ENCOUNTER — TELEMEDICINE (OUTPATIENT)
Dept: BEHAVIORAL/MENTAL HEALTH CLINIC | Facility: CLINIC | Age: 33
End: 2023-05-25

## 2023-05-25 DIAGNOSIS — F41.1 GAD (GENERALIZED ANXIETY DISORDER): Primary | ICD-10-CM

## 2023-05-25 NOTE — PSYCH
Virtual Regular Visit    Verification of patient location:    Patient is located at Home in the following state in which I hold an active license [place]      Assessment/Plan:    Problem List Items Addressed This Visit        Other    TONIE (generalized anxiety disorder) - Primary       Goals addressed in session: Goal 1          Reason for visit is No chief complaint on file  Encounter provider Marsha Corral    Provider located at [place] [address]  [address] [facility] [address] [phone]      Recent Visits  No visits were found meeting these conditions  Showing recent visits within past 7 days and meeting all other requirements  Today's Visits  Date Type Provider Dept   05/25/23 Telemedicine Marsha Corral [facility] Therapist [facility]   Showing today's visits and meeting all other requirements  Future Appointments  No visits were found meeting these conditions  Showing future appointments within next 150 days and meeting all other requirements       The patient was identified by name and date of birth  Haley Churchill III was informed that this is a telemedicine visit and that the visit is being conducted throughthe [facility]  He agrees to proceed     My office door was closed  No one else was in the room  He acknowledged consent and understanding of privacy and security of the video platform  The patient has agreed to participate and understands they can discontinue the visit at any time  Patient is aware this is a billable service  Subjective  Haley Churchill III is a 29 y o  male     HPI     No past medical history on file  No past surgical history on file      Current Outpatient Medications   Medication Sig Dispense Refill   • busPIRone (BUSPAR) 15 mg tablet Take 1 tablet (15 mg total) by mouth 2 (two) times a day 60 tablet 2   • Humira Pen 40 MG/0 4ML PNKT      • pantoprazole "(PROTONIX) 40 mg tablet Take 1 tablet (40 mg total) by mouth daily for 14 days 14 tablet 0   • PARoxetine (PAXIL) 10 mg tablet Take 1 tablet (10 mg total) by mouth daily At noon 30 tablet 0   • PARoxetine (PAXIL) 20 mg tablet Take 1 tablet (20 mg total) by mouth every morning 30 tablet 0     No current facility-administered medications for this visit  No Known Allergies    Review of Systems    Video Exam    There were no vitals filed for this visit  Physical Exam       Behavioral Health Psychotherapy Progress Note    Psychotherapy Provided: Individual Psychotherapy     1  TONIE (generalized anxiety disorder)            Goals addressed in session: Goal 1     DATA: Met with Winston Watts  Session began with open ended questions to gain feedback on mood  He reported positive spirits and expressed he is having an increase in \"episodes  \" He expressed these are anxiety attacks (chest tightness, trouble breathing, lightheaded)  Session worked to continue to attempt to identify triggers  LCSW challenged Winston Watts when he was unable to identify triggers because the attacks seems to be when he needs to physically exert himself in some way  He did express an current increase in anxiety because 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  He expressed worries such as \"how many steps will I have to walk,\" \"will there be a place for me to sit down,\" and \"what if there is no where for me to go if I have an attack  \" Winston Watts reported that he took a  5mg klonopin yesterday and 1mg klonopin today and felt there was a positive effect from them  LCSW expressed dangers of self-medicating and provided psychoeducation on his current medications  Discussed and processed planning for these worries that he can do on his own without medications  Winston Watts continues to focus on physical health problems  Discussed healthy choices such as decreasing smoking cigarettes, weight management, and exercise   Winston Watts continues to show ambivalence with willingness " "to change at this time  During this session, this clinician used the following therapeutic modalities: Client-centered Therapy, Cognitive Behavioral Therapy and Supportive Psychotherapy    Substance Abuse was not addressed during this session  If the client is diagnosed with a co-occurring substance use disorder, please indicate any changes in the frequency or amount of use: N/A  Stage of change for addressing substance use diagnoses: No substance use/Not applicable    ASSESSMENT:  Kasie Rivera III presents with a Euthymic/ normal mood  his affect is Normal range and intensity, which is congruent, with his mood and the content of the session  The client has not made progress on their goals  Sonny Michelle presents with a minimal risk of suicide, minimal risk of self-harm, and none risk of harm to others  For any risk assessment that surpasses a \"low\" rating, a safety plan must be developed  A safety plan was indicated: no  If yes, describe in detail N/A    PLAN: Between sessions, Kasie Rivera III will identify and note triggers to anxiety  At the next session, the therapist will use Client-centered Therapy, Cognitive Behavioral Therapy and Supportive Psychotherapy to address motivation for change       Behavioral Health Treatment Plan and Discharge Planning: Kasie Rivera III is aware of and agrees to continue to work on their treatment plan  They have identified and are working toward their discharge goals   yes    Visit start and stop times:    05/25/23  Start Time: 1000  Stop Time: 1050  Total Visit Time: 50 minutes  "

## 2023-06-01 ENCOUNTER — OFFICE VISIT (OUTPATIENT)
Dept: PSYCHIATRY | Facility: CLINIC | Age: 33
End: 2023-06-01

## 2023-06-01 DIAGNOSIS — F33.1 MODERATE RECURRENT MAJOR DEPRESSION (HCC): Primary | ICD-10-CM

## 2023-06-01 DIAGNOSIS — F41.1 GAD (GENERALIZED ANXIETY DISORDER): ICD-10-CM

## 2023-06-01 DIAGNOSIS — F41.0 PANIC DISORDER: ICD-10-CM

## 2023-06-01 RX ORDER — HYDROXYZINE HYDROCHLORIDE 25 MG/1
25 TABLET, FILM COATED ORAL EVERY 6 HOURS PRN
Qty: 50 TABLET | Refills: 1 | Status: SHIPPED | OUTPATIENT
Start: 2023-06-01

## 2023-06-01 RX ORDER — PAROXETINE HYDROCHLORIDE 20 MG/1
20 TABLET, FILM COATED ORAL 2 TIMES DAILY
Qty: 60 TABLET | Refills: 1 | Status: SHIPPED | OUTPATIENT
Start: 2023-06-01

## 2023-06-01 NOTE — PSYCH
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- had to excuse himself and go into the bathroom  Last panic attack was yesterday about 10 am as he was thinking about his day  Panic also precipitated by shortness of breath on exertion  Describes mood as irritable with low motivation  Describes overall anxiety as \"numb body feeling\" and restlessness  Anxiety exacerbated by financial stressors, difficulty paying back rent leading to arguments with landlord  Continues to see [facility] for ind therapy  Review of Systems   Constitutional: Negative for activity change  Respiratory: Positive for apnea  Is using CPAP for MILTON  Was scheduled for in lab study at [facility]  but he cancelled  Past Psychiatric History     Inpatient:  X one -teen  Attempted to hang self as a teen- blade reis  Outpatient:  As a teen in foster care system; heavily medicated  Currently sees Sadie Zamudio for ind therapy  Trauma/Loss History:     Bio mother  10 yo  Very strained relationship with step-mom  Placed in residential care and then foster care  Suicide attempt while in foster care  Family Psychiatric History:      Psychiatric Illness:      unknown  Substance Abuse:       unknown  Suicide Attempts:        unknown     Social History:             to wife Aris Valdez  Strained relationship   4 children    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  Hx of IEP since 4th grade  OBJECTIVE:     MENTAL STATUS EXAM  Appearance:  age appropriate   Behavior:  cooperative   Speech:  Normal volume, regular rate and rhythm   Mood:  tense   Affect:  mood congruent   Language: intact and appropriate for age, education, and intellect   Thought Process:  goal directed   Associations: intact associations   Thought Content:  no overt delusions   Perceptual Disturbances: no auditory or visual hallcunations   Risk Potential / Abnormal Thoughts: Suicidal ideation - None  Homicidal ideation - None  Potential for aggression - No       Consciousness:  Alert & Awake   Sensorium:  Grossly oriented   Attention: attention span and concentration are age appropriate       Fund of Knowledge:  Memory: awareness of current events: yes  recent and remote memory grossly intact   Insight:  intact   Judgment: intact   Muscle Strength Muscle Tone: Grossly normal  normal   Gait/Station: normal gait/station with good balance   Motor Activity: no abnormal movements       Lab Review: I have reviewed all pertinent labs  Lab Results   Component Value Date    HGBA1C 5 7 (H) 02/16/2023     3/21/23: TSH/CMP/H&H -WNL      ASSESSMENT & PLAN          Diagnoses and all orders for this visit:    Moderate recurrent major depression (HCC)    Panic disorder  -     PARoxetine (PAXIL) 20 mg tablet; Take 1 tablet (20 mg total) by mouth 2 (two) times a day  -     hydrOXYzine HCL (ATARAX) 25 mg tablet; Take 1 tablet (25 mg total) by mouth every 6 (six) hours as needed for anxiety    TONIE (generalized anxiety disorder)  -     hydrOXYzine HCL (ATARAX) 25 mg tablet;  Take 1 tablet (25 mg total) by mouth every 6 (six) hours as needed for anxiety      Current Outpatient Medications   Medication Sig Dispense Refill   • hydrOXYzine HCL (ATARAX) 25 mg tablet Take 1 tablet (25 mg total) by mouth every 6 (six) hours as needed for anxiety 50 tablet 1   • PARoxetine (PAXIL) 20 mg tablet Take 1 tablet (20 mg total) by mouth 2 (two) times a day 60 tablet 1   • busPIRone (BUSPAR) 15 mg tablet Take 1 tablet (15 mg total) by mouth 2 (two) times a day 60 tablet 2   • Humira Pen 40 MG/0 4ML PNKT      • pantoprazole (PROTONIX) 40 mg tablet Take 1 tablet (40 mg total) by mouth daily for 14 days 14 tablet 0     No current facility-administered medications for this visit  Plan:            Increase paxil to 20 mg bid- this will be highest dose- for anxiety/panic  Do not use unprescribed meds! Cont buspar 15 mg bid and trial atarax 25 mg q 6 hrs prn  Warned of drowsiness with antihistamine  Do not drive until you know how it effects you  Reviewed risks, benefits, side effects of medications, including no medication  Patient understands and agrees to treatment plan  Cont f u with Ines for ind therapy          F/u Lisa 4 weeks, sooner prn         Patient has been informed of 24 hours and weekend coverage for urgent situations accessed by calling the main clinic phone number       Gautam Srinivasan PA-C   Visit Time    Visit Start Time: 1000  Visit Stop Time: 3081  Total Visit Duration: 17 minutes

## 2023-06-07 ENCOUNTER — TELEMEDICINE (OUTPATIENT)
Dept: BEHAVIORAL/MENTAL HEALTH CLINIC | Facility: CLINIC | Age: 33
End: 2023-06-07
Payer: COMMERCIAL

## 2023-06-07 DIAGNOSIS — F33.0 MILD EPISODE OF RECURRENT MAJOR DEPRESSIVE DISORDER (HCC): ICD-10-CM

## 2023-06-07 DIAGNOSIS — F41.1 GAD (GENERALIZED ANXIETY DISORDER): Primary | ICD-10-CM

## 2023-06-07 PROCEDURE — 90834 PSYTX W PT 45 MINUTES: CPT

## 2023-06-13 NOTE — PSYCH
Virtual Regular Visit    Verification of patient location:    Patient is located at Home in the following state in which I hold an active license [place]      Assessment/Plan:    Problem List Items Addressed This Visit        Other    TONIE (generalized anxiety disorder) - Primary   Other Visit Diagnoses     Mild episode of recurrent major depressive disorder ([facility] [address] )              Goals addressed in session: Goal 1          Reason for visit is No chief complaint on file  Encounter provider Michael Lindsey    Provider located at [address] [address] [phone]      Recent Visits  Date Type Provider Dept   06/07/23 Telemedicine Michael Lindsey [facility] Therapist [address] [facility]   Showing recent visits within past 7 days and meeting all other requirements  Future Appointments  No visits were found meeting these conditions  Showing future appointments within next 150 days and meeting all other requirements       The patient was identified by name and date of birth  Michael Silver III was informed that this is a telemedicine visit and that the visit is being conducted thr[facility]  He agrees to proceed     My office door was closed  No one else was in the room  He acknowledged consent and understanding of privacy and security of the video platform  The patient has agreed to participate and understands they can discontinue the visit at any time  Patient is aware this is a billable service  Subjective  Michael Silver III is a 29 y o  male    HPI     No past medical history on file  No past surgical history on file      Current Outpatient Medications   Medication Sig Dispense Refill   • busPIRone (BUSPAR) 15 mg tablet Take 1 tablet (15 mg total) by mouth 2 (two) times a day 60 tablet 2   • Humira Pen 40 MG/0 4ML PNKT      • hydrOXYzine HCL (ATARAX) 25 mg tablet Take 1 "tablet (25 mg total) by mouth every 6 (six) hours as needed for anxiety 50 tablet 1   • pantoprazole (PROTONIX) 40 mg tablet Take 1 tablet (40 mg total) by mouth daily for 14 days 14 tablet 0   • PARoxetine (PAXIL) 20 mg tablet Take 1 tablet (20 mg total) by mouth 2 (two) times a day 60 tablet 1     No current facility-administered medications for this visit  No Known Allergies    Review of Systems    Video Exam    There were no vitals filed for this visit  Physical Exam     Behavioral Health Psychotherapy Progress Note    Psychotherapy Provided: Individual Psychotherapy     1  TONIE (generalized anxiety disorder)        2  Mild episode of recurrent major depressive disorder ([facility] [address] )            Goals addressed in session: Goal 1     DATA: Met with Umang Puckett  Session began with open ended questions to gain feedback on mood  He reported positive spirits  He expressed he continues to struggle with his \"attacks\" which he describes as panic attacks with chest tightness and lightheadedness  He reported he continues to self medicate and use klonopin   5 -1mg a day to assist him  He reported he cannot find anyone that will prescribe him this  Discussed concerns with using this medication that is not being monitored  LCSW asked Umang Puckett what he is attempting to do differently  He expressed \"not much  \" Discussed and processed unhealthy eating habits and cigarette smoking  Umang Puckett asked if his wife can join session to discuss the attacks that she sees  She reported that she has noticed a change in Umang Redhead and not being able to complete tasks that he once used to  Wife identified the physical symptoms that she sees  The pair was encouraged to document the attacks to better help identify the triggers  It continues to seem to be worry and fear that he will pass out in a public place if he needs to walk too much or up a flight of stairs     During this session, this clinician used the following therapeutic modalities: Client-centered " "Therapy and Cognitive Behavioral Therapy    Substance Abuse was addressed during this session  If the client is diagnosed with a co-occurring substance use disorder, please indicate any changes in the frequency or amount of use: klonopin  Stage of change for addressing substance use diagnoses: Contemplation    ASSESSMENT:  Cherry Torres III presents with a Euthymic/ normal mood  his affect is Normal range and intensity, which is congruent, with his mood and the content of the session  The client has not made progress on their goals  Mary Vogel presents with a minimal risk of suicide, minimal risk of self-harm, and none risk of harm to others  For any risk assessment that surpasses a \"low\" rating, a safety plan must be developed  A safety plan was indicated: no  If yes, describe in detail N/A    PLAN: Between sessions, Cherry Torres III will track \"attacks\" to assist in identifying triggers  At the next session, the therapist will use Client-centered Therapy and Cognitive Behavioral Therapy to address anxiety related symptoms  Behavioral Health Treatment Plan and Discharge Planning: Cherry Torres III is aware of and agrees to continue to work on their treatment plan  They have identified and are working toward their discharge goals   yes    Visit start and stop times:    06/07/23  Start Time: 1100  Stop Time: 1138  Total Visit Time: 38 minutes    "

## 2023-06-15 ENCOUNTER — TELEMEDICINE (OUTPATIENT)
Dept: BEHAVIORAL/MENTAL HEALTH CLINIC | Facility: CLINIC | Age: 33
End: 2023-06-15

## 2023-06-15 DIAGNOSIS — Z91.199 NO-SHOW FOR APPOINTMENT: Primary | ICD-10-CM

## 2023-06-15 NOTE — PSYCH
No Call  No Show  No Charge    Vy Montero III no showed 06/15/23 appointment , staff called and left message to reschedule appointment     Treatment Plan not due at this session

## 2023-07-05 ENCOUNTER — OFFICE VISIT (OUTPATIENT)
Dept: PSYCHIATRY | Facility: CLINIC | Age: 33
End: 2023-07-05
Payer: COMMERCIAL

## 2023-07-05 DIAGNOSIS — F41.1 GAD (GENERALIZED ANXIETY DISORDER): ICD-10-CM

## 2023-07-05 DIAGNOSIS — F33.1 MODERATE RECURRENT MAJOR DEPRESSION (HCC): ICD-10-CM

## 2023-07-05 DIAGNOSIS — F41.0 PANIC DISORDER: Primary | ICD-10-CM

## 2023-07-05 PROCEDURE — 99213 OFFICE O/P EST LOW 20 MIN: CPT | Performed by: PHYSICIAN ASSISTANT

## 2023-07-05 RX ORDER — BUSPIRONE HYDROCHLORIDE 15 MG/1
15 TABLET ORAL 3 TIMES DAILY
Qty: 90 TABLET | Refills: 2 | Status: SHIPPED | OUTPATIENT
Start: 2023-07-05

## 2023-07-05 RX ORDER — PAROXETINE HYDROCHLORIDE HEMIHYDRATE 37.5 MG/1
37.5 TABLET, FILM COATED, EXTENDED RELEASE ORAL EVERY MORNING
Qty: 30 TABLET | Refills: 1 | Status: SHIPPED | OUTPATIENT
Start: 2023-07-05

## 2023-07-05 NOTE — PSYCH
MEDICATION MANAGEMENT NOTE        [address] [place] [address] [facility] [address] [facility] [address] [phone]        Name and Date of Birth:  Anil Toscano III 29 y.o. 1990    Date of Visit: 2023/seen with HARSH Mayo with pt's permission    SUBJECTIVE:          Chart reviewed. Cristy Duran seen by Lisa  at which time paxil increased and prn atarax given. Cristy Duran reports atarax takes edge off symptoms and paxil and reduced frequency and intensity of panic symptoms. Panic usually occurs now in the setting of leaving the home. Anxiety symptoms- tingliness in extremities, dizziness, feels "spacey" and in a fog; jittery, palpitations (worked up by cardiology), shortness of breath. Reports feeling "constantly tired" with low motivation/pushing self to do things. Denies SI. Describes fear of dying and "nonexistence". Has recurrent memory of when he was a small child and mom laying next to him when he was sick. Mom got sick a week later and . She was 33 yo. Symptoms exacerbated by financial stressors, marital strain. Cristy Duran also has sensation of paxil "wearing off" in evening (takes am and noon) with increased in anxiety symptoms in pm and early am.          Of note, was using a friend's klonopin- hasn't done so since last visit here, he says. Review of Systems   HENT: Positive for ear pain and tinnitus. Intermittent eye pain; hx of loud noise exposure   Musculoskeletal:        Pain "all over" -chronic- under care of rheumatology. Neurological: Positive for dizziness. Normal EEG; sees neuro 7.27   Psychiatric/Behavioral: Negative for sleep disturbance. Past Psychiatric History     Inpatient:  X one -teen. Attempted to hang self as a teen- rope broke. Outpatient:  As a teen in foster care system; heavily medicated.    Currently sees [place] for ind therapy. This office since 23. Trauma/Loss History:     Bio mother  10 yo. Very strained relationship with step-mom. Placed in residential care and then foster care. Suicide attempt while in foster care. Family Psychiatric History:      Psychiatric Illness:      unknown  Substance Abuse:       unknown  Suicide Attempts:        unknown     Social History:             to wife Radha Dyer. Strained relationship.  4 children. 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 Hx of IEP since 4th grade.              OBJECTIVE:     MENTAL STATUS EXAM  Appearance:  age appropriate, dressed casually   Behavior:  Pleasant & cooperative   Speech:  Normal volume, regular rate and rhythm   Mood:  anxious   Affect:  mood congruent   Language: intact and appropriate for age, education, and intellect   Thought Process:  circumstantial   Associations: circumstantial associations   Thought Content:  negative thinking and cognitive distortions, no overt delusions, negative ruminations   Perceptual Disturbances: no auditory or visual hallcunations   Risk Potential / Abnormal Thoughts: Suicidal ideation - None  Homicidal ideation - None  Potential for aggression - No       Consciousness:  Alert & Awake   Sensorium:  Grossly oriented   Attention: attention span and concentration are age appropriate       Fund of Knowledge:  Memory: awareness of current events: yes  recent and remote memory grossly intact   Insight:  intact   Judgment: intact   Muscle Strength Muscle Tone: Grossly normal  normal   Gait/Station: normal gait/station with good balance   Motor Activity: no abnormal movements       Lab Review: I have reviewed all pertinent labs  Lab Results   Component Value Date    HGBA1C 5.7 (H) 2023     3/21/223: TSH 4.04; H&H 12.8/39.3; BMP- WNL   3/17/23:  CMP -WNL     ASSESSMENT & PLAN          Diagnoses and all orders for this visit:    Panic disorder  - PARoxetine (PAXIL-CR) 37.5 mg 24 hr tablet; Take 1 tablet (37.5 mg total) by mouth every morning    TONIE (generalized anxiety disorder)  -     busPIRone (BUSPAR) 15 mg tablet; Take 1 tablet (15 mg total) by mouth 3 (three) times a day  -     PARoxetine (PAXIL-CR) 37.5 mg 24 hr tablet; Take 1 tablet (37.5 mg total) by mouth every morning    Moderate recurrent major depression (HCC)  -     PARoxetine (PAXIL-CR) 37.5 mg 24 hr tablet; Take 1 tablet (37.5 mg total) by mouth every morning      Current Outpatient Medications   Medication Sig Dispense Refill   • busPIRone (BUSPAR) 15 mg tablet Take 1 tablet (15 mg total) by mouth 3 (three) times a day 90 tablet 2   • PARoxetine (PAXIL-CR) 37.5 mg 24 hr tablet Take 1 tablet (37.5 mg total) by mouth every morning 30 tablet 1   • Humira Pen 40 MG/0.4ML PNKT      • hydrOXYzine HCL (ATARAX) 25 mg tablet Take 1 tablet (25 mg total) by mouth every 6 (six) hours as needed for anxiety 50 tablet 1   • pantoprazole (PROTONIX) 40 mg tablet Take 1 tablet (40 mg total) by mouth daily for 14 days 14 tablet 0     No current facility-administered medications for this visit. Plan:         Change paxil to paxil cr 37.5 mg for efficacy; Increase buspar 15 mg tid and cont atarax 25 mg q 6 hrs prn    Reviewed risks, benefits, side effects of medications, including no medication. Patient understands and agrees to treatment plan. Cont f/u [facility] for ind therapy         F/u Lisa 4 weeks, sooner prn         Patient has been informed of 24 hours and weekend coverage for urgent situations accessed by calling the main clinic phone number.      Bernard Brown PA-C   Visit Time    Visit Start Time: 5771  Visit Stop Time: 4265  Total Visit Duration: 29 minutes

## 2023-07-11 ENCOUNTER — TELEMEDICINE (OUTPATIENT)
Dept: BEHAVIORAL/MENTAL HEALTH CLINIC | Facility: CLINIC | Age: 33
End: 2023-07-11
Payer: COMMERCIAL

## 2023-07-11 DIAGNOSIS — F41.1 GAD (GENERALIZED ANXIETY DISORDER): Primary | ICD-10-CM

## 2023-07-11 PROCEDURE — 90834 PSYTX W PT 45 MINUTES: CPT

## 2023-07-11 NOTE — PSYCH
Behavioral Health Psychotherapy Progress Note    Psychotherapy Provided: Individual Psychotherapy     1. TONIE (generalized anxiety disorder)            Goals addressed in session: Goal 1     DATA: Met with Sena Angeles. Session began with open ended questions to gain feedback on mood. He expressed he has been doing well. He continues to endorse "attacks" which he reports happen when he is in social situations. Discussed history with his mother becoming sick and dying after he was sick. He reported he feels this has no connection to his current challenges. Discussed his irrational thought content in regards to his own physical symptoms and that he feels he may die. Sena Angeles was able to report that he feels this is a thought that crosses his mind but he is doing better with perseverating. Sena Angeles focused on his "attacks" being when he is around people. He expressed he feels he has social anxiety. 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 This has caused some tension in the marriage. He processed recent fights between him and his wife. Session ended with processing ways to ground and challenge himself with social interactions. During this session, this clinician used the following therapeutic modalities: Cognitive Behavioral Therapy and Supportive Psychotherapy    Substance Abuse was not addressed during this session. If the client is diagnosed with a co-occurring substance use disorder, please indicate any changes in the frequency or amount of use: N/A. Stage of change for addressing substance use diagnoses: No substance use/Not applicable    ASSESSMENT:  Brittnee Mclean III presents with a Euthymic/ normal mood. his affect is Normal range and intensity, which is congruent, with his mood and the content of the session. The client has made progress on their goals.      Yamileth Peterson presents with a none risk of suicide, none risk of self-harm, and none risk of harm to others. For any risk assessment that surpasses a "low" rating, a safety plan must be developed. A safety plan was indicated: no  If yes, describe in detail N/A    PLAN: Between sessions, Remberto Gold III will use his natural supports and coping skills. At the next session, the therapist will use Cognitive Behavioral Therapy and Supportive Psychotherapy to address anxiety related symptoms. Behavioral Health Treatment Plan and Discharge Planning: Remberto Gold III is aware of and agrees to continue to work on their treatment plan. They have identified and are working toward their discharge goals.  yes    Visit start and stop times:    07/11/23  Start Time: 1100  Stop Time: 1145  Total Visit Time: 45 minutes

## 2023-07-14 ENCOUNTER — TELEPHONE (OUTPATIENT)
Dept: PSYCHIATRY | Facility: CLINIC | Age: 33
End: 2023-07-14

## 2023-11-09 ENCOUNTER — TELEPHONE (OUTPATIENT)
Dept: BARIATRICS | Facility: CLINIC | Age: 33
End: 2023-11-09

## 2023-11-09 NOTE — TELEPHONE ENCOUNTER
I tried to contact the patient to make sure he received my email with his checklist and video on our surgeons his phone number was not in service.

## 2023-11-14 ENCOUNTER — OFFICE VISIT (OUTPATIENT)
Dept: BARIATRICS | Facility: CLINIC | Age: 33
End: 2023-11-14

## 2023-11-14 VITALS — WEIGHT: 315 LBS | HEIGHT: 69 IN | BODY MASS INDEX: 46.65 KG/M2

## 2023-11-14 DIAGNOSIS — E66.01 MORBID OBESITY (HCC): Primary | ICD-10-CM

## 2023-11-14 PROCEDURE — RECHECK

## 2023-11-14 RX ORDER — CLONAZEPAM 1 MG/1
TABLET ORAL
COMMUNITY
Start: 2023-10-27

## 2023-11-14 RX ORDER — SERTRALINE HYDROCHLORIDE 100 MG/1
TABLET, FILM COATED ORAL
COMMUNITY
Start: 2023-10-27

## 2023-11-14 RX ORDER — FAMOTIDINE 20 MG/1
TABLET, FILM COATED ORAL
COMMUNITY
Start: 2023-10-18

## 2023-11-14 NOTE — PROGRESS NOTES
Spoke to patient on the phone:    Patient is interested in the bariatric surgical process. Patient qualifies for surgery with current comorbidities and BMI. Discussed the entire surgical workup process and all requirements of [facility] program and patient's insurance. Answered all questions at the time of the phone call. All SW/RD questions redirected to the next appointment, the 2-hour evaluation. Patient verbalized understanding of the surgical process at [facility] [place] and had no further questions at this time. Spoke to patient on the phone:    Patient is interested in the bariatric surgical process. Patient qualifies for surgery with current comorbidities and BMI. Discussed the entire surgical workup process and all requirements of [facility] program and patient's insurance. Answered all questions at the time of the phone call. All SW/RD questions redirected to the next appointment, the 2-hour evaluation. Patient verbalized understanding of the surgical process at [facility] [place] and had no further questions at this time.

## 2024-02-09 ENCOUNTER — HOSPITAL ENCOUNTER (EMERGENCY)
Facility: HOSPITAL | Age: 34
End: 2024-02-09
Attending: EMERGENCY MEDICINE
Payer: COMMERCIAL

## 2024-02-09 VITALS
SYSTOLIC BLOOD PRESSURE: 130 MMHG | OXYGEN SATURATION: 97 % | HEART RATE: 76 BPM | RESPIRATION RATE: 18 BRPM | TEMPERATURE: 98.2 F | DIASTOLIC BLOOD PRESSURE: 78 MMHG

## 2024-02-09 DIAGNOSIS — R45.851 SUICIDAL IDEATIONS: Primary | ICD-10-CM

## 2024-02-09 LAB
AMPHETAMINES SERPL QL SCN: NEGATIVE
BARBITURATES UR QL: NEGATIVE
BENZODIAZ UR QL: POSITIVE
COCAINE UR QL: NEGATIVE
ETHANOL EXG-MCNC: 0 MG/DL
METHADONE UR QL: NEGATIVE
OPIATES UR QL SCN: NEGATIVE
OXYCODONE+OXYMORPHONE UR QL SCN: NEGATIVE
PCP UR QL: NEGATIVE
THC UR QL: NEGATIVE

## 2024-02-09 PROCEDURE — 94760 N-INVAS EAR/PLS OXIMETRY 1: CPT

## 2024-02-09 PROCEDURE — 80307 DRUG TEST PRSMV CHEM ANLYZR: CPT | Performed by: EMERGENCY MEDICINE

## 2024-02-09 PROCEDURE — 99284 EMERGENCY DEPT VISIT MOD MDM: CPT

## 2024-02-09 PROCEDURE — 82075 ASSAY OF BREATH ETHANOL: CPT | Performed by: EMERGENCY MEDICINE

## 2024-02-09 PROCEDURE — 94660 CPAP INITIATION&MGMT: CPT

## 2024-02-09 RX ORDER — FAMOTIDINE 20 MG/1
20 TABLET, FILM COATED ORAL
Status: DISCONTINUED | OUTPATIENT
Start: 2024-02-09 | End: 2024-02-09 | Stop reason: HOSPADM

## 2024-02-09 RX ORDER — PANTOPRAZOLE SODIUM 40 MG/1
40 TABLET, DELAYED RELEASE ORAL
Status: DISCONTINUED | OUTPATIENT
Start: 2024-02-09 | End: 2024-02-09 | Stop reason: HOSPADM

## 2024-02-09 RX ORDER — NICOTINE 21 MG/24HR
14 PATCH, TRANSDERMAL 24 HOURS TRANSDERMAL ONCE
Status: DISCONTINUED | OUTPATIENT
Start: 2024-02-09 | End: 2024-02-09 | Stop reason: HOSPADM

## 2024-02-09 RX ORDER — SERTRALINE HYDROCHLORIDE 25 MG/1
TABLET, FILM COATED ORAL
Status: DISCONTINUED
Start: 2024-02-09 | End: 2024-02-09 | Stop reason: HOSPADM

## 2024-02-09 RX ORDER — CLONAZEPAM 0.5 MG/1
1 TABLET ORAL 2 TIMES DAILY
Status: DISCONTINUED | OUTPATIENT
Start: 2024-02-09 | End: 2024-02-09 | Stop reason: HOSPADM

## 2024-02-09 RX ORDER — SERTRALINE HYDROCHLORIDE 100 MG/1
TABLET, FILM COATED ORAL
Status: DISCONTINUED
Start: 2024-02-09 | End: 2024-02-09 | Stop reason: HOSPADM

## 2024-02-09 RX ORDER — OMEPRAZOLE 20 MG/1
20 CAPSULE, DELAYED RELEASE ORAL
Status: DISCONTINUED | OUTPATIENT
Start: 2024-02-09 | End: 2024-02-09 | Stop reason: SDUPTHER

## 2024-02-09 RX ORDER — PANTOPRAZOLE SODIUM 40 MG/1
40 TABLET, DELAYED RELEASE ORAL
Status: DISCONTINUED | OUTPATIENT
Start: 2024-02-09 | End: 2024-02-09

## 2024-02-09 RX ADMIN — SERTRALINE 150 MG: 100 TABLET, FILM COATED ORAL at 10:01

## 2024-02-09 RX ADMIN — CLONAZEPAM 1 MG: 0.5 TABLET ORAL at 10:00

## 2024-02-09 RX ADMIN — PANTOPRAZOLE SODIUM 40 MG: 40 TABLET, DELAYED RELEASE ORAL at 06:26

## 2024-02-09 NOTE — ED NOTES
PA Promise    ID# [id number]    [facility] 02/09/2024

## 2024-02-09 NOTE — ED NOTES
Pt to have psych eval to deem appropriate for 201.  iPad [facility] ED Roaming 02 setup with virtual rounding.

## 2024-02-09 NOTE — ED NOTES
SANFORD from [facility]. Was just called by Meenu DEAN, why don't I have the 302? I reported that no 302 was supplied. I explained that I just called [facility] and they told me their CRISIS worker leaves at 2300 and there is no one that can help me. Celeste stated that they have faxed the 302 to CRISIS office but this is not true as I have been beside fax machine since initial call. Celeste was instructed to scan/fax to me the 302 as we will not proceed w/ 302 if I have no paperwork. Pt is claiming depression but denying SI. I cannot do anything at this point as I have no paperwork and wife saw fit to leave Pt in the ED prior to giving any information.

## 2024-02-09 NOTE — ED NOTES
302 petition testimony, Lizet Adames, wife-petitioner.    On 2/2/2024 Gurpreet talked about wanting to die over text message, I Lizet Adames (his wife) called the police to do a safety check. The police said he seemed fine, they stated he smelled like alcohol but seemed to be fine and not a danger to himself or others. Later on on 2/2/2024 he took 20mg of his Klonopin (he stated) and went to sleep in his car thinking it would kill him. Ever since that day he has been taking too much of his medication and said tonight he is going to kill himself and he wants me to say goodbye (which is the same thing he said last time he attempted suicide). He owns a gun and has talked about killing himself with his meds, gun, running his car off the road, jumping to his death, and hanging himself. Tonight in the car on our way home he told me stole tools out of my father's garage to kill one of my friends but changed his mind. I've never seen him like this before and believe he is a danger to himself and his family. I have sent pictures of the text messages to the crisis worker for you to reference.     (No photos of any texts were contained in the petition)

## 2024-02-09 NOTE — ED NOTES
302/ACT 77 successfully transmitted to [facility] [place] sending report.

## 2024-02-09 NOTE — ED NOTES
[facility] to [facility]. There is a 302, it was delegated. Explained that must be faxed immediately as there was no prior warning and neither wife nor law enforcement took the time to speak to anyone in the ED and just deposited him here. As it was explained PD told wife to drive him or they'd take him. Wife apparently transported, left, called charge nurse.
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
## 2024-02-09 NOTE — ED NOTES
Insurance Authorization for admission:   Phone call placed to GRETA Swartz.  Phone number: [phone].     Spoke to Germán BELLE     04 days approved.  Level of care: Inpatient Behavioral Health (302).  Review on **.   Authorization # To be given upon admission.        Eligibility Verification System checked - ([phone]).  Online system / automated system indicates:     [facility]      [facility] 02/09/2024             Insurance Authorization for Transportation:    Phone call placed to **.   Phone number **.   Spoke to **.   Authorization #: **
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
## 2024-02-09 NOTE — ED NOTES
Information relayed by provider, wife petitioned 302 at [facility], [facility] supposedly faxed the 302 to [facility]. [facility] told Pt he could either come in of his own volition or that they would transport him or she could. Wife stated that she has no paperwork. Wife encouraged to call [facility] as this office has no paperwork and [facility] is not known to handle these situations in a timely fashion.

## 2024-02-09 NOTE — ED PROVIDER NOTES
History  Chief Complaint   Patient presents with   • Depression     Going through separation from wife, feeling more sad than usual.  Denies SI/HI.      This is a 33-year-old male with a history of depression who presents for crisis evaluation.  Patient states that he is going through a divorce and under a lot of stress had an argument with his wife today and he states that he did make some statements about hurting himself but currently denies it to me his wife reportedly filed a 302 petition and with [facility] but paperwork is not present at this time.  There is a gun in the house.  Patient denies any previous suicide attempt      History provided by:  Patient  Medical Problem  Location:  Generalized  Quality:  Depression  Severity:  Unable to specify  Timing:  Constant  Progression:  Waxing and waning  Chronicity:  New  Context:  Generalized depression  Worsened by:  Divorce      Prior to Admission Medications   Prescriptions Last Dose Informant Patient Reported? Taking?   Humira Pen 40 MG/0.4ML PNKT   Yes No   Patient not taking: Reported on 11/14/2023   PARoxetine (PAXIL-CR) 37.5 mg 24 hr tablet   No No   Sig: Take 1 tablet (37.5 mg total) by mouth every morning   Patient not taking: Reported on 11/14/2023   busPIRone (BUSPAR) 15 mg tablet   No No   Sig: Take 1 tablet (15 mg total) by mouth 3 (three) times a day   Patient not taking: Reported on 11/14/2023   clonazePAM (KlonoPIN) 1 mg tablet   Yes No   famotidine (PEPCID) 20 mg tablet   Yes No   Sig: take 1 tablet by mouth twice a day 1/2 HOUR BEFORE MEALS   hydrOXYzine HCL (ATARAX) 25 mg tablet   No No   Sig: Take 1 tablet (25 mg total) by mouth every 6 (six) hours as needed for anxiety   Patient not taking: Reported on 11/14/2023   pantoprazole (PROTONIX) 40 mg tablet   No No   Sig: Take 1 tablet (40 mg total) by mouth daily for 14 days   sertraline (ZOLOFT) 100 mg tablet   Yes No      Facility-Administered Medications: None       Past Medical History:    Diagnosis Date   • Sleep apnea        History reviewed. No pertinent surgical history.    History reviewed. No pertinent family history.  I have reviewed and agree with the history as documented.    E-Cigarette/Vaping     E-Cigarette/Vaping Substances     Social History     Tobacco Use   • Smoking status: Every Day     Types: Cigarettes   • Smokeless tobacco: Never   Substance Use Topics   • Alcohol use: Yes     Comment: social   • Drug use: No       Review of Systems   Psychiatric/Behavioral:  Positive for agitation, sleep disturbance and suicidal ideas. The patient is nervous/anxious.    All other systems reviewed and are negative.      Physical Exam  Physical Exam  Vitals and nursing note reviewed.   Constitutional:       General: He is not in acute distress.     Appearance: He is not ill-appearing, toxic-appearing or diaphoretic.   HENT:      Head: Normocephalic and atraumatic.      Right Ear: External ear normal.      Left Ear: External ear normal.   Eyes:      Extraocular Movements: Extraocular movements intact.      Pupils: Pupils are equal, round, and reactive to light.   Cardiovascular:      Rate and Rhythm: Normal rate and regular rhythm.      Pulses: Normal pulses.      Heart sounds: No murmur heard.     No friction rub. No gallop.   Pulmonary:      Effort: Pulmonary effort is normal. No respiratory distress.      Breath sounds: No stridor. No wheezing, rhonchi or rales.   Abdominal:      General: There is no distension.      Palpations: Abdomen is soft.      Tenderness: There is no abdominal tenderness. There is no guarding or rebound.   Musculoskeletal:         General: No swelling, tenderness, deformity or signs of injury. Normal range of motion.      Cervical back: Normal range of motion and neck supple. No rigidity.      Right lower leg: No edema.      Left lower leg: No edema.   Skin:     General: Skin is warm and dry.      Coloration: Skin is not jaundiced.      Findings: No bruising, erythema or  rash.   Neurological:      General: No focal deficit present.      Mental Status: He is alert and oriented to person, place, and time.      Cranial Nerves: No cranial nerve deficit.      Sensory: No sensory deficit.      Motor: No weakness.   Psychiatric:         Behavior: Behavior normal.         Thought Content: Thought content normal.       Vital Signs  ED Triage Vitals [02/09/24 0138]   Temperature Pulse Respirations Blood Pressure SpO2   (!) 97.1 °F (36.2 °C) 69 18 166/81 98 %      Temp Source Heart Rate Source Patient Position - Orthostatic VS BP Location FiO2 (%)   Temporal Monitor -- Right arm --      Pain Score       No Pain           Vitals:    02/09/24 0138   BP: 166/81   Pulse: 69         Visual Acuity      ED Medications  Medications - No data to display    Diagnostic Studies  Results Reviewed       None                   No orders to display              Procedures  Procedures         ED Course                                             Medical Decision Making  Depression with suicidal statements to wife will consult crisis           Disposition  Final diagnoses:   None     ED Disposition       None          Follow-up Information    None         Patient's Medications   Discharge Prescriptions    No medications on file       No discharge procedures on file.    PDMP Review         Value Time User    PDMP Reviewed  Yes 3/30/2023 12:28 PM JESSICA Rodriguez            ED Provider  Electronically Signed by             Cody Olson DO  02/09/24 4003

## 2024-02-09 NOTE — ED NOTES
Pt signed 201 after psych eval.  [facility] also [facility], pt informed of right to do so.

## 2024-02-09 NOTE — ED NOTES
"Pt denies current SI's, states \"everything earlier was in the heat of the moment\" though does not elaborate, endorses multiple protective factors     Frandy Perrin RN  02/09/24 0157    "

## 2024-02-09 NOTE — ED NOTES
Patient is accepted at [facility]  Patient is accepted by Dr. Gia Sosa.     Transportation is arranged with [facility].     Transportation is scheduled for 12:20 [facility]  Patient may go to the floor at anytime.       No nurse report

## 2024-02-09 NOTE — LETTER
[facility] EMERGENCY DEPARTMENT  [address] [facility]  [facility] [address]  Dept: [phone]      EMTALA TRANSFER CONSENT    NAME Gurpreet Adames III                                         1990                              MRN [id number]    I have been informed of my rights regarding examination, treatment, and transfer   by Dr. Felton Sarmiento DO    Benefits: Continuity of care    Risks: Potential for delay in receiving treatment      Consent for Transfer:  I acknowledge that my medical condition has been evaluated and explained to me by the emergency department physician or other qualified medical person and/or my attending physician, who has recommended that I be transferred to the service of  Accepting Physician: Dr Nielsen at Accepting Facility Name, City & State : [place]. The above potential benefits of such transfer, the potential risks associated with such transfer, and the probable risks of not being transferred have been explained to me, and I fully understand them.  The doctor has explained that, in my case, the benefits of transfer outweigh the risks.  I agree to be transferred.    I authorize the performance of emergency medical procedures and treatments upon me in both transit and upon arrival at the receiving facility.  Additionally, I authorize the release of any and all medical records to the receiving facility and request they be transported with me, if possible.  I understand that the safest mode of transportation during a medical emergency is an ambulance and that the Hospital advocates the use of this mode of transport. Risks of traveling to the receiving facility by car, including absence of medical control, life sustaining equipment, such as oxygen, and medical personnel has been explained to me and I fully understand them.    (ALEJO CORRECT BOX BELOW)  [  ]  I consent to the stated transfer and to be transported by ambulance/helicopter.  [  ]   I consent to the stated transfer, but refuse transportation by ambulance and accept full responsibility for my transportation by car.  I understand the risks of non-ambulance transfers and I exonerate the Hospital and its staff from any deterioration in my condition that results from this refusal.    X___________________________________________    DATE  24  TIME________  Signature of patient or legally responsible individual signing on patient behalf           RELATIONSHIP TO PATIENT_________________________          Provider Certification    NAME Gurpreet Adames III                                         1990                              MRN [id number]    A medical screening exam was performed on the above named patient.  Based on the examination:    Condition Necessitating Transfer The encounter diagnosis was Suicidal ideations.    Patient Condition: The patient has been stabilized such that within reasonable medical probability, no material deterioration of the patient condition or the condition of the unborn child(ellis) is likely to result from the transfer    Reason for Transfer: Level of Care needed not available at this facility    Transfer Requirements: Facility [facility]   Space available and qualified personnel available for treatment as acknowledged by Masood  Agreed to accept transfer and to provide appropriate medical treatment as acknowledged by       Dr Nielsen  Appropriate medical records of the examination and treatment of the patient are provided at the time of transfer   STAFF INITIAL WHEN COMPLETED _______  Transfer will be performed by qualified personnel from    and appropriate transfer equipment as required, including the use of necessary and appropriate life support measures.    Provider Certification: I have examined the patient and explained the following risks and benefits of being transferred/refusing transfer to the patient/family:  The patient is stable for  psychiatric transfer because they are medically stable, and is protected from harming him/herself or others during transport      Based on these reasonable risks and benefits to the patient and/or the unborn child(ellis), and based upon the information available at the time of the patient’s examination, I certify that the medical benefits reasonably to be expected from the provision of appropriate medical treatments at another medical facility outweigh the increasing risks, if any, to the individual’s medical condition, and in the case of labor to the unborn child, from effecting the transfer.    X____________________________________________ DATE 02/09/24        TIME_______      ORIGINAL - SEND TO MEDICAL RECORDS   COPY - SEND WITH PATIENT DURING TRANSFER

## 2024-02-09 NOTE — ED NOTES
Bed Search    Yenny (Tone): clinical faxed  Shana Leonardo (Jf): clinical faxed  [place]: clinical faxed  [facility] ([place]): clinical faxed  [place] (Sruthi): clinical faxed  Ravindra (Sheila): clinical faxed  Ciarra (Sri): clinical faxed   JOSE (Guerda): Clinical faxed  [place]: no answer

## 2024-03-08 ENCOUNTER — TELEPHONE (OUTPATIENT)
Dept: PSYCHIATRY | Facility: CLINIC | Age: 34
End: 2024-03-08

## 2024-03-08 NOTE — TELEPHONE ENCOUNTER
Patient called to inquire about therapy and medication management. Patient is willing to be seen virtually   Writer advised he is added to the wait list.

## 2024-03-11 ENCOUNTER — TELEPHONE (OUTPATIENT)
Dept: PSYCHIATRY | Facility: CLINIC | Age: 34
End: 2024-03-11

## 2024-03-11 NOTE — TELEPHONE ENCOUNTER
"Behavioral Health Outpatient Intake Questions    Referred By   : SELF    Please advise interviewee that they need to answer all questions truthfully to allow for best care, and any misrepresentations of information may affect their ability to be seen at this clinic   => Was this discussed? Yes     If Minor Child (under age 18)    Who is/are the legal guardian(s) of the child?     Is there a custody agreement? No     If \"YES\"- Custody orders must be obtained prior to scheduling the first appointment  In addition, Consent to Treatment must be signed by all legal guardians prior to scheduling the first appointment    If \"NO\"- Consent to Treatment must be signed by all legal guardians prior to scheduling the first appointment    Behavioral Health Outpatient Intake History -     Presenting Problem (in patient's own words): Depression, Panic attacks, and agoraphobia.  Are there any communication barriers for this patient?     No                                               If yes, please describe barriers:   If there is a unique situation, please refer to Neno Cowart/Paula Jade for final determination.    Are you taking any psychiatric medications? Yes     If \"YES\" -What are they Zoloft, Klonopin     If \"YES\" -Who prescribes?     Has the Patient previously received outpatient Talk Therapy or Medication Management from [facility]  No        If \"YES\"- When, Where and with Whom?           If \"NO\" -Has Patient received these services elsewhere?       If \"YES\" -When, Where, and with Whom?    Has the Patient abused alcohol or other substances in the last 6 months ? No       If \"YES\" -What substance, How much, How often?     If illegal substance: Refer to [facility] (for MEG) or [facility].   If Alcohol in excess of 10 drinks per week:  Refer to [facility] (for MEG) or [facility] Off[facility]    Legal History-     Is this treatment court ordered? No   If \"yes \"send to :  Talk Therapy : Send to Neno Cowart/Paula Jade " "for final determination   Med Management: Send to Dr Mcduffie for final determination     Has the Patient been convicted of a felony?  No   If \"Yes\" send to -When, What?  Talk Therapy : Send to Neno Cowart/Paula Jade for final determination   Med Management: Send to Dr Mcduffie for final determination     ACCEPTED as a patient Yes  If \"Yes\" Appointment Date: 3/15/24    Referred Elsewhere? No  If “Yes” - (Where? Ex: [facility], [facility], [facility], [facility], etc.)   Name of Insurance Co: [employer]/ BROOKLYNN OLPEZ  Insurance ID#  [id number]  Insurance Phone #  If ins is primary or secondary?  If patient is a minor, parents information such as Name, D.O.B of guarantor.  "

## 2024-03-14 ENCOUNTER — DOCUMENTATION (OUTPATIENT)
Dept: BEHAVIORAL/MENTAL HEALTH CLINIC | Facility: CLINIC | Age: 34
End: 2024-03-14

## 2024-03-14 NOTE — PROGRESS NOTES
"Psychotherapy Discharge Summary    Preferred Name: Gurpreet Adames III  YOB: 1990    Admission date to psychotherapy: 03/03/2023    Referred by: [facility])    Presenting Problem: Per [facility]: Pt is a 32 year old male currently  with 4 children. Pt and spouse moved into [facility] approx one year ago and has not had outpatient services since this time. 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 Due to work, pt would be gone for several weeks at a time, which contributed to stressors in the marriage. Pt. Now works for a [employer] company and is home more often, but still in debt due to his former company closing. PT admits that some poor business decisions had contributed to the business closure. Pt also admits to some other stressors in the marriage including viewing pornograpy, and being four months behind in their rent for their residence. Pt has noticed over the past few weeks that his wife has been texting someone , but has been secretive about whom she was texting. When confronted, the wife admits that she was texting a former therapist. This evening the wife was texting again to the therapist and around midnight, there was a phone call from \"[employer]\" on the wife's phone. The pt was about to answer his wife's phone, but the wife stated that this was the title she put in her phone for the former therapist. The pt's wife suffers with depression. There are four children, the oldest being nine, and the youngest being born in Nov. 2022. The pt. Worries about the children and his wife being alone all day, and having no services. The wife suffers from depression. Pt fears that they will end in divorce, but would like to salvage their marriage. As of this time, the pt is requesting out patient services with a psych and therapist. These appointments have been set up for March 30, at 1 PM with Psych and 2 PM with the therapist     Course of " treatment included : individual therapy     Progress/Outcome of Treatment Goals (brief summary of course of treatment) Gurpreet attended 9 psychotherapy session focusing on anxiety related symptoms. He struggled to implement learned skills and often seemed distracted during session, which were majority held virtually. Gurpreet was a no show to 3 appointments and will be discharged     Treatment Complications (if any): consistency with sessions    Treatment Progress: fair    Current [facility] Psychiatric Provider: none    Discharge Medications include: n/a    Discharge Date: 03/14/24    Discharge Diagnosis: TONIE    Criteria for Discharge: two or more unexcused absences for services    Aftercare recommendations include (include specific referral names and phone numbers, if appropriate): Gurpreet moved out of area. He will be encouraged to contact his insurance for referral resources.     Prognosis: fair

## 2024-03-15 ENCOUNTER — TELEMEDICINE (OUTPATIENT)
Dept: PSYCHIATRY | Facility: CLINIC | Age: 34
End: 2024-03-15
Payer: COMMERCIAL

## 2024-03-15 DIAGNOSIS — F41.1 GAD (GENERALIZED ANXIETY DISORDER): ICD-10-CM

## 2024-03-15 DIAGNOSIS — F33.1 MAJOR DEPRESSIVE DISORDER, RECURRENT, MODERATE (HCC): Primary | ICD-10-CM

## 2024-03-15 PROCEDURE — 90792 PSYCH DIAG EVAL W/MED SRVCS: CPT | Performed by: STUDENT IN AN ORGANIZED HEALTH CARE EDUCATION/TRAINING PROGRAM

## 2024-03-15 RX ORDER — CLONAZEPAM 1 MG/1
1 TABLET ORAL 2 TIMES DAILY
Qty: 60 TABLET | Refills: 0 | Status: SHIPPED | OUTPATIENT
Start: 2024-03-15 | End: 2024-04-14

## 2024-03-15 RX ORDER — SERTRALINE HYDROCHLORIDE 100 MG/1
200 TABLET, FILM COATED ORAL DAILY
Qty: 28 TABLET | Refills: 0 | Status: SHIPPED | OUTPATIENT
Start: 2024-03-15 | End: 2024-03-29

## 2024-03-15 NOTE — PSYCH
PSYCHIATRIC EVALUATION     [facility] [place] [facility]    Name and Date of Birth:  Gurpreet Adames III 33 y.o. 1990 MRN: [id number]    Date of Visit: March 15, 2024    Reason for visit: Full psychiatric intake assessment for medication management       Virtual Visit Disclaimer:       TeleMed provider: Irene Ambrose D.O.    Location: [place]     Verification of patient location:     Patient is currently located in the [facility]  Patient is currently located in a state in which I am licensed     After connecting through [facility], the patient was identified by name and date of birth.  Gurpreet Adames III was informed that this is a telemedicine visit that is being conducted through [employer], and the patient was informed that this is a secure, HIPAA-compliant platform. My office door was closed. No one else was in the room. Gurpreet Adames III acknowledged consent and understanding of privacy and security of the video platform. Gurpreet understands that the online visit is based solely on information provided by the patient, and that, in the absence of a face-to-face physical evaluation by the physician, the diagnosis Gurpreet  receives is both limited and provisional in terms of accuracy and completeness. Gurpreet Adames III understands that they can discontinue the visit at any time. I informed Gurpreet that I have reviewed their record in [facility] and presented the opportunity for them to ask any questions regarding the visit today. Gurpreet Adames III voiced understanding and consented to these terms. Gurpreet is aware this is a billable service. Gurpreet is present in [facility]     Gurpreet Adames III is a 33 y.o. male with a past psychiatric history significant for MDD, TONIE, panic disorder, agoraphobia who presents to the [facility] outpatient clinic for intake assessment. Gurpreet presents as a new patient for this physician after being recently discharged from  inpatient psychiatry on February 14 for suicidal ideation.    When speaking to the patient, he endorses long-time history of depression and anxiety.  He states that his mother passed away when he was 8 years old and that deeply affected him and his stepmother did not like him.  This caused tension between him and his father.  He states that he had at least 3 suicide attempts growing up including self cutting, drinking bleach, attempting to hang himself.  He states that he dropped out of school around 10th grade and was put in juvenile system until 21 years old.  He states that he his father also gave up on him around this time.  He states that he felt decreased mood symptoms around this time as well and stopped medications until around 27 years old.  He states that around 27 he had a cardiac event and diagnosed with supraventricular tachycardia and high blood pressure.  He states that he was also diagnosed with severe anxiety around that time as well and went to line up evaluate for psychiatric care and obtain services of therapy from [facility].  He states that he switched to [facility] [place] due to insurance issues.      With regard to his personal life he states that he has 4 kids however he has had a strained relationship with his wife.  He states that in 2022 he showed down his company because of his anxiety and that financial difficulties have made his relationship difficult with his wife.  He states that at one point he did not leave his house for a year due to agoraphobia.  Most recently, he had been hospitalized for suicidal thoughts after her wife had decided to separate from him.  He made homicidal statements to wife towards her friends and made suicidal statements as well.  Wife made 302 involuntary commitment petition which was upheld.  Patient states that he has sold the gun since then and has documents improving voiced understanding agreement to send in that document to this office to be added to his  records.      Otherwise, with regards to patient's history he denies AVH, binta but does endorse persecutory traits towards his wife and has believes that she is plotting against him and with other men.  These persecutory traits also carry over to his perception of his relationship with other individuals as well.  Patient states that he will not harm himself because he believes that his children need him.  After discussion of risks, benefits, and side effects, alternatives, patient did note that he felt mildly better on sertraline which will be increased from 150 mg daily to 200 mg daily.  He continues to take Klonopin 1 mg twice daily.  He denies acute mental complaints or concerns at this time but does endorse feelings of depression and anxiety for which he would like a therapist from [facility].        Current Rating Scores:     Current PHQ-9   PHQ-2/9 Depression Screening           Current TONIE-7 is .    Psychiatric Review Of Systems:    Sleep changes: decreased  Appetite changes: no change  Weight changes: no change  Energy/anergy: decreased  Interest/pleasure/anhedonia: decreased  Somatic symptoms: no  Anxiety/panic: panic attacks, worrying  Binta: no  Guilty/hopeless: yes  Self injurious behavior/risky behavior:  As per HPI, none currently  Suicidal ideation:  As per HPI, none currently  Homicidal ideation:  Recently made homicidal statements against wife's friends  Auditory hallucinations: no  Visual hallucinations: no  Other hallucinations: no  Delusional thinking: paranoid thoughts  Eating disorder history: unknown  Obsessive/compulsive symptoms: unknown    Review Of Systems:    Constitutional negative   ENT negative   Cardiovascular negative   Respiratory negative   Gastrointestinal negative   Genitourinary negative   Musculoskeletal negative   Integumentary negative   Neurological negative   Endocrine negative   Other Symptoms none, all other systems are negative       Family Psychiatric History:      No family history on file.  Depression, grandmother has agorophobia, dad and siblings depresion    Past Psychiatric History:     Inpatient psychiatric admissions: At least 1 hospitalization the patient endorses, from February 9, 2024 to February 14  Prior outpatient psychiatric linkage: None reported  Past/current psychotherapy: None reported  History of suicidal attempts/gestures: At least 3 suicide attempts throughout her life  History of violence/aggressive behaviors: Homicidal statements towards wife's friends  Psychotropic medication trials: topamax, seroquel, prozac , lexapro, buspar,  Substance abuse inpatient/outpatient rehabilitation: None reported    Substance Abuse History:    No history of ETOH, illict substance abuse. No past legal actions or arrests secondary to substance intoxication. The patient denies prior DWIs/DUIs. No history of outpatient/inpatient rehabilitation programs. Gurpreet does not exhibit objective evidence of substance withdrawal during today's examination nor does Gurpreet appear under the influence of any psychoactive substance.      Alcohol: two drinks or so, every month, denies any history of withdrawal symptoms, seizures  Tobacco: 1 pack/day    Social History:    Developmental: Denies a history of milestone/developmental delay. Denies a history of in-utero exposure to toxins/illicit substances. There is no documented history of IEP or need for special education.  Education: some college  Marital history:   Living arrangement, social support: father, states that he lives with his father on his couch  Occupational History: unemployed  Access to firearms: Denies direct access to weapons/firearms. Gurpreet Adames III has no history of arrests or violence with a deadly weapon.     Traumatic History:     Abuse:emotional and verbal  Other Traumatic Events:  Ongoing separation from wife, agoraphobia during 2022 when he did not leave his home for a year    Past Medical  History:    Past Medical History:   Diagnosis Date    Sleep apnea         No past surgical history on file.  Allergies   Allergen Reactions    Medical Tape Rash     Welts, from ekg patches       History Review:    The following portions of the patient's history were reviewed and updated as appropriate: allergies, current medications, past family history, past medical history, past social history, past surgical history, and problem list.    OBJECTIVE:    Vital signs in last 24 hours:    There were no vitals filed for this visit.    Mental Status Evaluation:    Appearance age appropriate, casually dressed   Behavior cooperative, appears anxious   Speech normal rate, normal volume, normal pitch   Mood depressed   Affect blunted   Thought Processes organized, goal directed   Associations intact associations   Thought Content mild paranoia   Perceptual Disturbances: no auditory hallucinations, no visual hallucinations   Abnormal Thoughts  Risk Potential Suicidal ideation - None at present  Homicidal ideation - None at present  Potential for aggression - Not at present   Orientation oriented to person, place, time/date, and situation   Memory recent and remote memory grossly intact   Consciousness alert and awake   Attention Span Concentration Span attention span and concentration are age appropriate   Intellect appears to be of average intelligence   Insight intact   Judgement intact   Muscle Strength and  Gait unable to assess today due to virtual visit   Motor Activity unable to assess today due to virtual visit   Language no difficulty naming common objects, no difficulty repeating a phrase   Fund of Knowledge adequate knowledge of current events  adequate fund of knowledge regarding past history  adequate fund of knowledge regarding vocabulary    Pain none   Pain Scale 0       Laboratory Results: I have personally reviewed all pertinent laboratory/tests results    Recent Labs (last 2 months):   Admission on  02/09/2024, Discharged on 02/09/2024   Component Date Value    Amph/Meth UR 02/09/2024 Negative     Barbiturate Ur 02/09/2024 Negative     Benzodiazepine Urine 02/09/2024 Positive (A)     Cocaine Urine 02/09/2024 Negative     Methadone Urine 02/09/2024 Negative     Opiate Urine 02/09/2024 Negative     PCP Ur 02/09/2024 Negative     THC Urine 02/09/2024 Negative     Oxycodone Urine 02/09/2024 Negative     EXTBreath Alcohol 02/09/2024 0.000        Suicide/Homicide Risk Assessment:    Risk of Harm to Self:  The following ratings are based on assessment at the time of the interview  Historical Risk Factors include: chronic psychiatric problems  Protective Factors: no current suicidal ideation, being a parent, compliant with medications, compliant with mental health treatment, connection to own children    Risk of Harm to Others:  The following ratings are based on assessment at the time of the interview  Historical Risk Factors include: history of aggressive behavior.  Protective Factors: no current homicidal ideation, being a parent, compliant with medications, compliant with mental health treatment, connection to own children    The following interventions are recommended: contracts for safety at present - agrees to go to ED if feeling unsafe, contracts for safety at present - agrees to call Crisis Intervention Service if feeling unsafe. Although patient's acute lethality risk is LOW, long-term/chronic lethality risk is mildly elevated given historical mental health diagoses. However, at the current moment, Gurpreet is future-oriented, forward-thinking, and demonstrates ability to act in a self-preserving manner as evidenced by volitionally presenting to the appointment today, seeking treatment. Additionally, Gurpreet's responses suggest a will and desire to live. At this juncture, inpatient hospitalization is not currently warranted. To mitigate future risk, patient should adhere to treatment recommendations, avoid  alcohol/illicit substance use, utilize community-based resources and familiar support, and prioritize mental health treatment.     DSM-V Diagnoses:     1.)  MDD, recurrent, severe  2.)  TONIE  3.)  Agoraphobia    Assessment/Plan:       After discussion of risks, benefits, and side effects, alternatives, we will increase sertraline to 200 mg daily due to past efficacy at 150 mg daily.  We will continue Klonopin 1 mg twice daily.  Patient will be referred to therapy.    Today's Plan/Medical Decision Making:    Psychopharmacologically, I spoke at length with Gurpreet about the bio-psycho-social approach to treatment and avenues for intervention. I stressed the importance of making better dietary choices, expanding exercise regimen, and reestablishing a sense of purpose and connectivity in life. I also emphasized the importance of establishing care with the primary care physician along with specialists relevant to their medical diagnoses to which the patient voiced understanding and agreement.        Treatment Recommendations/Precautions:        Medication management every 1 week  Aware of 24 hour and weekend coverage for urgent situations accessed by calling [facility] main practice number    Patient voiced understanding and agreement to call 911 or head to the nearest emergency room should they experience any physical decompensation whatsoever including but not limited to the red flag signs and symptoms of fevers, chills, chest pains, nausea, vomiting, dizziness, changes in vision, trouble breathing.  Patient was also offered the contact information of their local [facility] crisis hotline and voiced understanding and agreement to call it or [phone] or head to nearest emergency department immediately should they experience any mental health decompensation whatsoever including but not limited to SI, HI, increasing AVH, paddy.     Medications Risks/Benefits:      Risks, Benefits And Possible Side Effects  Of Medications:    Risks, benefits, and possible side effects of medications explained to Gurpreet and he verbalizes understanding and agreement for treatment.    Controlled Medication Discussion:     Gurpreet has been filling controlled prescriptions on time as prescribed according to [place] Prescription Drug Monitoring Program    Treatment Plan:    Completed and signed during the session: Not applicable - Treatment Plan to be completed by  [facility] therapist      Visit Time    Visit Start Time: 11:30 AM  Visit Stop Time: 12:10 PM  Total Visit Duration:  40 minutes     The total visit duration detailed above includes: patient engagement, medication management, psychotherapy/counseling, discussion regarding treatment goals, documentation, review of past medical records, and coordination of care.      Note Share Disclaimer:     This note was not shared with the patient due to reasonable likelihood of causing patient harm    Irene Ambrose DO  03/15/24

## 2024-03-20 ENCOUNTER — TELEPHONE (OUTPATIENT)
Dept: PSYCHIATRY | Facility: CLINIC | Age: 34
End: 2024-03-20

## 2024-03-22 ENCOUNTER — TELEMEDICINE (OUTPATIENT)
Dept: PSYCHIATRY | Facility: CLINIC | Age: 34
End: 2024-03-22
Payer: COMMERCIAL

## 2024-03-22 DIAGNOSIS — F33.1 MAJOR DEPRESSIVE DISORDER, RECURRENT, MODERATE (HCC): Primary | ICD-10-CM

## 2024-03-22 DIAGNOSIS — F41.0 PANIC DISORDER: ICD-10-CM

## 2024-03-22 DIAGNOSIS — F41.1 GAD (GENERALIZED ANXIETY DISORDER): ICD-10-CM

## 2024-03-22 PROCEDURE — 99214 OFFICE O/P EST MOD 30 MIN: CPT | Performed by: STUDENT IN AN ORGANIZED HEALTH CARE EDUCATION/TRAINING PROGRAM

## 2024-03-22 RX ORDER — BUPROPION HYDROCHLORIDE 150 MG/1
150 TABLET ORAL DAILY
Qty: 14 TABLET | Refills: 0 | Status: SHIPPED | OUTPATIENT
Start: 2024-03-22 | End: 2024-04-05

## 2024-03-22 NOTE — PSYCH
MEDICATION MANAGEMENT NOTE        [facility]      Name and Date of Birth:  Gurpreet Adames III 33 y.o. 1990 MRN: [id number]    Date of Visit: March 22, 2024    Reason for Visit: Follow-up visit for medication management       SUBJECTIVE:    Gurpreet Adames III is a 33 y.o. male with past psychiatric history significant for  who was personally seen and evaluated today at the [facility] outpatient clinic for follow-up and medication management. Gurpreet denies SI, HI, AVH, delusions, paddy at this time or since our last appointment.  However, he does endorse continued feelings of depression and anxiety secondary to his relationship difficulties with his ex-wife.  Patient believes that she is sleeping with other men and is not serious about getting back together contrary to what he believes that she is telling her.  He also states that he has recently come down with a bout of pneumonia and flu and visited the urgent care yesterday.  After discussion of risks, benefits, and side effects, alternatives, given patient's history of smoking, bupropion will be trialed at 150 mg every morning to help patient's anxiety and reduce his tobacco dependence.  Otherwise, he denies acute complaints or concerns at this time.        Current Rating Scores:     None completed today.    Review Of Systems:      Constitutional low energy   ENT nasal discharge   Cardiovascular negative   Respiratory cough   Gastrointestinal negative   Genitourinary negative   Musculoskeletal muscle aches   Integumentary negative   Neurological negative   Endocrine negative   Other Symptoms none, all other systems are negative       Past Psychiatric History: (unchanged information from previous note copied and italicized) - Information that is bolded has been updated.         Substance Abuse History: (unchanged information from previous note copied and italicized) - Information that is  bolded has been updated.         Social History: (unchanged information from previous note copied and italicized) - Information that is bolded has been updated.         Traumatic History: (unchanged information from previous note copied and italicized) - Information that is bolded has been updated.           Past Medical History:    Past Medical History:   Diagnosis Date    Sleep apnea         No past surgical history on file.  Allergies   Allergen Reactions    Medical Tape Rash     Welts, from ekg patches       Substance Abuse History:    Social History     Substance and Sexual Activity   Alcohol Use Yes    Comment: social     Social History     Substance and Sexual Activity   Drug Use No       Social History:    Social History     Socioeconomic History    Marital status: /Civil Union     Spouse name: Not on file    Number of children: Not on file    Years of education: Not on file    Highest education level: Not on file   Occupational History    Not on file   Tobacco Use    Smoking status: Every Day     Types: Cigarettes    Smokeless tobacco: Never   Substance and Sexual Activity    Alcohol use: Yes     Comment: social    Drug use: No    Sexual activity: Yes     Partners: Female   Other Topics Concern    Not on file   Social History Narrative    Not on file     Social Determinants of Health     Financial Resource Strain: High Risk (3/11/2024)    Received from [facility]    Overall Financial Resource Strain (CARDIA)     Difficulty of Paying Living Expenses: Very hard   Food Insecurity: Food Insecurity Present (3/11/2024)    Received from [facility]    Hunger Vital Sign     Worried About Running Out of Food in the Last Year: Sometimes true     Ran Out of Food in the Last Year: Often true   Transportation Needs: No Transportation Needs (3/11/2024)    Received from [facility]    PRAPARE - Transportation     Lack of Transportation (Medical): No     Lack of  Transportation (Non-Medical): No   Physical Activity: Insufficiently Active (2/5/2023)    Received from [facility]    Exercise Vital Sign     Days of Exercise per Week: 3 days     Minutes of Exercise per Session: 10 min   Stress: Stress Concern Present (3/11/2024)    Received from [facility]    [employer] - Occupational Stress Questionnaire     Feeling of Stress : Rather much   Social Connections: Feeling Somewhat Isolated (3/11/2024)    Received from [facility]    OASIS : Social Isolation     How often do you feel lonely or isolated from those around you?: Sometimes   Intimate Partner Violence: Not At Risk (3/11/2024)    Received from [facility]    Humiliation, Afraid, Rape, and Kick questionnaire     Fear of Current or Ex-Partner: No     Emotionally Abused: No     Physically Abused: No     Sexually Abused: No   Housing Stability: High Risk (3/11/2024)    Received from [facility]    Housing Stability Vital Sign     Unable to Pay for Housing in the Last Year: Yes     Number of Places Lived in the Last Year: 2     Unstable Housing in the Last Year: Yes       Family Psychiatric History:     No family history on file.    History Review: The following portions of the patient's history were reviewed and updated as appropriate: allergies, current medications, past family history, past medical history, past social history, past surgical history, and problem list.         OBJECTIVE:     Vital signs in last 24 hours:    There were no vitals filed for this visit.    Mental Status Evaluation:    Appearance age appropriate, casually dressed   Behavior cooperative, calm   Speech normal rate, normal volume, normal pitch   Mood dysphoric   Affect constricted   Thought Processes organized, goal directed   Associations intact associations   Thought Content no overt delusions   Perceptual Disturbances: no  auditory hallucinations, no visual hallucinations   Abnormal Thoughts  Risk Potential Suicidal ideation - None at present  Homicidal ideation - None at present  Potential for aggression - No   Orientation oriented to person, place, time/date, and situation   Memory recent and remote memory grossly intact   Consciousness alert and awake   Attention Span Concentration Span attention span and concentration are age appropriate   Intellect appears to be of average intelligence   Insight intact   Judgement intact   Muscle Strength and  Gait unable to assess today due to virtual visit   Motor activity unable to assess today due to virtual visit   Language no difficulty naming common objects, no difficulty repeating a phrase   Fund of Knowledge adequate knowledge of current events  adequate fund of knowledge regarding past history  adequate fund of knowledge regarding vocabulary    Pain mild   Pain Scale Did not ask patient to formally rate       Laboratory Results: I have personally reviewed all pertinent laboratory/tests results    Recent Labs (last 2 months):   Admission on 02/09/2024, Discharged on 02/09/2024   Component Date Value    Amph/Meth UR 02/09/2024 Negative     Barbiturate Ur 02/09/2024 Negative     Benzodiazepine Urine 02/09/2024 Positive (A)     Cocaine Urine 02/09/2024 Negative     Methadone Urine 02/09/2024 Negative     Opiate Urine 02/09/2024 Negative     PCP Ur 02/09/2024 Negative     THC Urine 02/09/2024 Negative     Oxycodone Urine 02/09/2024 Negative     EXTBreath Alcohol 02/09/2024 0.000        Suicide/Homicide Risk Assessment:    Risk of Harm to Self:  The following ratings are based on assessment at the time of the interview  Historical Risk Factors include: chronic psychiatric problems  Protective Factors: no current suicidal ideation, access to mental health treatment, compliant with medications, compliant with mental health treatment, connection to own children, having a desire to be  alive    Risk of Harm to Others:  The following ratings are based on assessment at the time of the interview  Historical Risk Factors include: history of aggressive behavior.  Protective Factors: no current homicidal ideation, compliant with medications, compliant with mental health treatment, connection to own children    The following interventions are recommended: contracts for safety at present - agrees to go to ED if feeling unsafe, contracts for safety at present - agrees to call Crisis Intervention Service if feeling unsafe      Lethality Statement:    Based on today's assessment and clinical criteria, Gurpreet Adames III contracts for safety and is not an imminent risk of harm to self or others. Outpatient level of care is deemed appropriate at this current time. Gurpreet understands that if they can no longer contract for safety, they need to call the office or report to their nearest Emergency Room for immediate evaluation. They voiced understanding and agreement to call 911 or head to the nearest ED should they have any physical or mental decompensation whatsoever.       Assessment/Plan:     1.)  MDD, recurrent, moderate  2.)  Agoraphobia with panic  3.)  TONIE      After discussion of risks and benefits, the side effects, alternatives, we will continue Klonopin 1 mg twice daily, Zoloft 200 mg daily and initiate bupropion 150 mg daily with the intent of titrating upwards as tolerated and effective.  Bupropion also has the added benefit of helping patient decrease his tobacco use.  He denies any acute mental complaints or concerns at this time and will be connected with the [facility] therapist within the next month.      Medication management every 2 weeks  Aware of 24 hour and weekend coverage for urgent situations accessed by calling [facility] main practice number    Medications Risks/Benefits      Risks, Benefits And Possible Side Effects Of Medications:    Risks, benefits, and  possible side effects of medications explained to Gurpreet and he verbalizes understanding and agreement for treatment.    Controlled Medication Discussion:     Gurpreet has been filling controlled prescriptions on time as prescribed according to [place] Prescription Drug Monitoring Program    Psychotherapy Provided:     Individual psychotherapy provided: Crisis/safety plan discussed with Gurpreet.     Treatment Plan:    Completed and signed during the session: Not applicable - Treatment Plan to be completed by [facility] therapist      Visit Time    Visit Start Time: 9:00 AM  Visit Stop Time: 9:15 AM  Total Visit Duration:  15 minutes     The total visit duration detailed above includes: patient engagement, medication management, psychotherapy/counseling, discussion regarding treatment goals, documentation, review of past medical records, and coordination of care.      Note Share Disclaimer:     This note was not shared with the patient due to reasonable likelihood of causing patient harm      Irene Ambrose DO  Psychiatry  03/22/24

## 2024-03-27 ENCOUNTER — TELEPHONE (OUTPATIENT)
Dept: PSYCHIATRY | Facility: CLINIC | Age: 34
End: 2024-03-27

## 2024-03-27 NOTE — TELEPHONE ENCOUNTER
Spoke to patient and/or parent/guardian to make aware of the Psych Encounter form needing to be signed from recent completed appointment(s).

## 2024-04-03 ENCOUNTER — TELEMEDICINE (OUTPATIENT)
Dept: PSYCHIATRY | Facility: CLINIC | Age: 34
End: 2024-04-03
Payer: COMMERCIAL

## 2024-04-03 DIAGNOSIS — F33.1 MAJOR DEPRESSIVE DISORDER, RECURRENT, MODERATE (HCC): ICD-10-CM

## 2024-04-03 DIAGNOSIS — F41.1 GAD (GENERALIZED ANXIETY DISORDER): Primary | ICD-10-CM

## 2024-04-03 PROCEDURE — 99214 OFFICE O/P EST MOD 30 MIN: CPT | Performed by: STUDENT IN AN ORGANIZED HEALTH CARE EDUCATION/TRAINING PROGRAM

## 2024-04-04 RX ORDER — BUSPIRONE HYDROCHLORIDE 15 MG/1
TABLET ORAL 2 TIMES DAILY
Qty: 130 TABLET | Refills: 0 | Status: SHIPPED | OUTPATIENT
Start: 2024-04-04 | End: 2024-05-09

## 2024-04-05 RX ORDER — SERTRALINE HYDROCHLORIDE 100 MG/1
200 TABLET, FILM COATED ORAL DAILY
Qty: 60 TABLET | Refills: 2 | Status: SHIPPED | OUTPATIENT
Start: 2024-04-05 | End: 2024-07-04

## 2024-04-05 RX ORDER — CLONAZEPAM 1 MG/1
1 TABLET ORAL 2 TIMES DAILY
Qty: 60 TABLET | Refills: 2 | Status: SHIPPED | OUTPATIENT
Start: 2024-04-05 | End: 2024-07-04

## 2024-04-05 RX ORDER — BUPROPION HYDROCHLORIDE 150 MG/1
150 TABLET ORAL DAILY
Qty: 30 TABLET | Refills: 2 | Status: SHIPPED | OUTPATIENT
Start: 2024-04-05 | End: 2024-07-04

## 2024-04-05 NOTE — PSYCH
MEDICATION MANAGEMENT NOTE        [facility]      Name and Date of Birth:  Gurpreet Adames III 33 y.o. 1990 MRN: [id number]    Date of Visit: April 5, 2024    Reason for Visit: Follow-up visit for medication management       SUBJECTIVE:    Gurpreet Adames III is a 33 y.o. male with past psychiatric history significant for  who was personally seen and evaluated today at the [facility] outpatient clinic for follow-up and medication management. Gurpreet denies SI, HI, AVH, delusions, paddy at this time or since our last appointment.  However, he does endorse continued feelings of depression and anxiety secondary to his relationship difficulties with his ex-wife.  Patient states that these are mildly improved compared with our previous appointment.  He also states that he will be starting a new job soon.  After discussion of risks and benefits, and side effects, alternatives, we will initiate buspirone 15 mg twice daily with the intent to titrate upwards to 30 mg twice daily to better control patient's anxiety as this had been effective for him in the past.  Otherwise, he denies any acute mental complaints or concerns at this time.        Current Rating Scores:     None completed today.    Review Of Systems:      Constitutional low energy   ENT nasal discharge   Cardiovascular negative   Respiratory cough   Gastrointestinal negative   Genitourinary negative   Musculoskeletal muscle aches   Integumentary negative   Neurological negative   Endocrine negative   Other Symptoms none, all other systems are negative       Past Psychiatric History: (unchanged information from previous note copied and italicized) - Information that is bolded has been updated.     See intake    Substance Abuse History: (unchanged information from previous note copied and italicized) - Information that is bolded has been updated.     See intake    Social History: (unchanged  information from previous note copied and italicized) - Information that is bolded has been updated.     See intake    Traumatic History: (unchanged information from previous note copied and italicized) - Information that is bolded has been updated.     See intake      Past Medical History:    Past Medical History:   Diagnosis Date    Sleep apnea         No past surgical history on file.  Allergies   Allergen Reactions    Medical Tape Rash     Welts, from ekg patches       Substance Abuse History:    Social History     Substance and Sexual Activity   Alcohol Use Yes    Comment: social     Social History     Substance and Sexual Activity   Drug Use No       Social History:    Social History     Socioeconomic History    Marital status: /Civil Union     Spouse name: Not on file    Number of children: Not on file    Years of education: Not on file    Highest education level: Not on file   Occupational History    Not on file   Tobacco Use    Smoking status: Every Day     Types: Cigarettes    Smokeless tobacco: Never   Substance and Sexual Activity    Alcohol use: Yes     Comment: social    Drug use: No    Sexual activity: Yes     Partners: Female   Other Topics Concern    Not on file   Social History Narrative    Not on file     Social Determinants of Health     Financial Resource Strain: High Risk (3/11/2024)    Received from [facility]    Overall Financial Resource Strain (CARDIA)     Difficulty of Paying Living Expenses: Very hard   Food Insecurity: Food Insecurity Present (3/11/2024)    Received from [facility]    Hunger Vital Sign     Worried About Running Out of Food in the Last Year: Sometimes true     Ran Out of Food in the Last Year: Often true   Transportation Needs: No Transportation Needs (3/11/2024)    Received from [facility]    PRAPARE - Transportation     Lack of Transportation (Medical): No     Lack of Transportation (Non-Medical): No    Physical Activity: Insufficiently Active (2/5/2023)    Received from [facility]    Exercise Vital Sign     Days of Exercise per Week: 3 days     Minutes of Exercise per Session: 10 min   Stress: Stress Concern Present (3/11/2024)    Received from [facility]    [employer] - Occupational Stress Questionnaire     Feeling of Stress : Rather much   Social Connections: Feeling Somewhat Isolated (3/11/2024)    Received from [facility]    OASIS : Social Isolation     How often do you feel lonely or isolated from those around you?: Sometimes   Intimate Partner Violence: Not At Risk (3/11/2024)    Received from [facility]    Humiliation, Afraid, Rape, and Kick questionnaire     Fear of Current or Ex-Partner: No     Emotionally Abused: No     Physically Abused: No     Sexually Abused: No   Housing Stability: High Risk (3/11/2024)    Received from [facility]    Housing Stability Vital Sign     Unable to Pay for Housing in the Last Year: Yes     Number of Places Lived in the Last Year: 2     Unstable Housing in the Last Year: Yes       Family Psychiatric History:     No family history on file.    History Review: The following portions of the patient's history were reviewed and updated as appropriate: allergies, current medications, past family history, past medical history, past social history, past surgical history, and problem list.         OBJECTIVE:     Vital signs in last 24 hours:    There were no vitals filed for this visit.    Mental Status Evaluation:    Appearance age appropriate, casually dressed   Behavior cooperative, calm   Speech normal rate, normal volume, normal pitch   Mood dysphoric   Affect constricted   Thought Processes organized, goal directed   Associations intact associations   Thought Content no overt delusions   Perceptual Disturbances: no auditory hallucinations, no visual  hallucinations   Abnormal Thoughts  Risk Potential Suicidal ideation - None at present  Homicidal ideation - None at present  Potential for aggression - No   Orientation oriented to person, place, time/date, and situation   Memory recent and remote memory grossly intact   Consciousness alert and awake   Attention Span Concentration Span attention span and concentration are age appropriate   Intellect appears to be of average intelligence   Insight intact   Judgement intact   Muscle Strength and  Gait unable to assess today due to virtual visit   Motor activity unable to assess today due to virtual visit   Language no difficulty naming common objects, no difficulty repeating a phrase   Fund of Knowledge adequate knowledge of current events  adequate fund of knowledge regarding past history  adequate fund of knowledge regarding vocabulary    Pain mild   Pain Scale Did not ask patient to formally rate       Laboratory Results: I have personally reviewed all pertinent laboratory/tests results    Recent Labs (last 2 months):   Admission on 02/09/2024, Discharged on 02/09/2024   Component Date Value    Amph/Meth UR 02/09/2024 Negative     Barbiturate Ur 02/09/2024 Negative     Benzodiazepine Urine 02/09/2024 Positive (A)     Cocaine Urine 02/09/2024 Negative     Methadone Urine 02/09/2024 Negative     Opiate Urine 02/09/2024 Negative     PCP Ur 02/09/2024 Negative     THC Urine 02/09/2024 Negative     Oxycodone Urine 02/09/2024 Negative     EXTBreath Alcohol 02/09/2024 0.000        Suicide/Homicide Risk Assessment:    Risk of Harm to Self:  The following ratings are based on assessment at the time of the interview  Historical Risk Factors include: chronic psychiatric problems  Protective Factors: no current suicidal ideation, access to mental health treatment, compliant with medications, compliant with mental health treatment, connection to own children, having a desire to be alive    Risk of Harm to Others:  The  following ratings are based on assessment at the time of the interview  Historical Risk Factors include: history of aggressive behavior.  Protective Factors: no current homicidal ideation, compliant with medications, compliant with mental health treatment, connection to own children    The following interventions are recommended: contracts for safety at present - agrees to go to ED if feeling unsafe, contracts for safety at present - agrees to call Crisis Intervention Service if feeling unsafe      Lethality Statement:    Based on today's assessment and clinical criteria, Gurpreet Adames III contracts for safety and is not an imminent risk of harm to self or others. Outpatient level of care is deemed appropriate at this current time. Gurpreet understands that if they can no longer contract for safety, they need to call the office or report to their nearest Emergency Room for immediate evaluation. They voiced understanding and agreement to call 911 or head to the nearest ED should they have any physical or mental decompensation whatsoever.       Assessment/Plan:     1.)  MDD, recurrent, moderate  2.)  Agoraphobia with panic  3.)  TONIE      After discussion of risks and benefits, the side effects, alternatives, we will initiate BuSpar 15 mg twice daily with the intent to titrate upwards to 30 mg twice daily.  Otherwise, we will continue Klonopin 1 mg twice daily, Zoloft 200 mg daily and bupropion 150 mg daily.        Medications Risks/Benefits      Risks, Benefits And Possible Side Effects Of Medications:    Risks, benefits, and possible side effects of medications explained to Gurpreet and he verbalizes understanding and agreement for treatment.    Controlled Medication Discussion:     Gurpreet has been filling controlled prescriptions on time as prescribed according to [place] Prescription Drug Monitoring Program    Psychotherapy Provided:     Individual psychotherapy provided: Crisis/safety plan discussed with Gurpreet.      Treatment Plan:    Completed and signed during the session: Not applicable - Treatment Plan to be completed by [facility] therapist      Visit Time    Visit Start Time: 9:30 AM  Visit Stop Time: 9:45 AM  Total Visit Duration:  15 minutes     The total visit duration detailed above includes: patient engagement, medication management, psychotherapy/counseling, discussion regarding treatment goals, documentation, review of past medical records, and coordination of care.      Note Share Disclaimer:     This note was not shared with the patient due to reasonable likelihood of causing patient harm      Irene Ambrose DO  Psychiatry  04/05/24

## 2024-04-11 ENCOUNTER — TELEMEDICINE (OUTPATIENT)
Dept: BEHAVIORAL/MENTAL HEALTH CLINIC | Facility: CLINIC | Age: 34
End: 2024-04-11
Payer: COMMERCIAL

## 2024-04-11 DIAGNOSIS — F41.1 GAD (GENERALIZED ANXIETY DISORDER): ICD-10-CM

## 2024-04-11 DIAGNOSIS — F41.0 PANIC DISORDER: Primary | ICD-10-CM

## 2024-04-11 PROCEDURE — 90834 PSYTX W PT 45 MINUTES: CPT | Performed by: COUNSELOR

## 2024-04-11 NOTE — PSYCH
Behavioral Health Psychotherapy Assessment    Date of Initial Psychotherapy Assessment: 04/11/24  Referral Source: self  Has a release of information been signed for the referral source? NA    Preferred Name: Gurpreet Adames MARCELINA  Preferred Pronouns: He/him  YOB: 1990 Age: 33 y.o.  Sex assigned at birth: male   Gender Identity: Male  Race:   Preferred Language: English    Emergency Contact:  Full Name: Father  Relationship to Client: Father  Contact information: In Cell    Primary Care Physician:  Rosa Mendez  [facility] [employer]  [facility] [address] [phone]  Has a release of information been signed? Yes    Physical Health History:  Past surgical procedures: N/A  Do you have a history of any of the following: other 302 on 2/09/2024 due to loss of marriage  Do you have any mobility issues? No    Relevant Family History:  None    Presenting Problem (What brings you in?)  Depression, anxiety    Mental Health Advance Directive:  Do you currently have a Mental Health Advance Directive?no    Diagnosis:   Diagnosis ICD-10-CM Associated Orders   1. Panic disorder  F41.0       2. TONIE (generalized anxiety disorder)  F41.1           Initial Assessment:     Current Mental Status:    Appearance: appropriate      Behavior/Manner: cooperative      Affect/Mood:  Relaxed    Speech:  Normal    Sleep:  Normal    Oriented to: oriented to self, oriented to place and oriented to time       Clinical Symptoms    Depression: yes      Depression Symptoms: depressed mood      Have you ever been assaultive to others or the environment: No      Have you ever been self-injurious: No      Counseling History:  Previous Counseling or Treatment  (Mental Health or Drug & Alcohol): No    Have you previously taken psychiatric medications: No      Suicide Risk Assessment  Have you ever had a suicide attempt: No    Have you had incidents of suicidal ideation: Yes    Are you currently experiencing suicidal  thoughts: No      Substance Abuse/Addiction Assessment:  Alcohol: No    Heroin: No    Fentanyl: No    Opiates: No    Cocaine: No    Amphetamines: No    Hallucinogens: No    Club Drugs: No    Benzodiazepines: No    Other Rx Meds: No    Marijuana: No    Tobacco/Nicotine: No    Have you experienced blackouts as a result of substance use: No    Have you had any periods of abstinence: No    Have you experienced symptoms of withdrawal: No    Have you ever overdosed on any substances?: No    Are you currently using any Medication Assisted Treatment for Substance Use: No      Trauma and Abuse History:    Have you ever been abused: No      Legal History:    Have you ever been arrested  or had a DUI: No      Have you been incarcerated: No      Are you currently on parole/probation: No      Any current Children and Youth involvement: No      Any pending legal charges: No      Relationship History:    Current marital status:       Employment History    Are you currently employed: Yes      Employer/ Job title:  / [employer]     History:      Status: no history of  duty  Educational History:     Highest level of education:  High school graduate    Have you ever had an IEP or 504-plan: No      Do you need assistance with reading or writing: No      Recommended Treatment:     Psychotherapy:  Individual sessions    Frequency:  2 times    Session frequency:  Monthly    Visit start and stop times:    04/11/24   Outpatient Behavioral Health Psychotherapy Treatment Plan    Gurpreet Adames III  1990

## 2024-04-23 ENCOUNTER — TELEMEDICINE (OUTPATIENT)
Dept: BEHAVIORAL/MENTAL HEALTH CLINIC | Facility: CLINIC | Age: 34
End: 2024-04-23
Payer: COMMERCIAL

## 2024-04-23 DIAGNOSIS — F33.1 MODERATE RECURRENT MAJOR DEPRESSION (HCC): Primary | ICD-10-CM

## 2024-04-23 PROCEDURE — 90834 PSYTX W PT 45 MINUTES: CPT | Performed by: COUNSELOR

## 2024-04-23 NOTE — BH CRISIS PLAN
Client Name: Gurpreet Adames III       Client YOB: 1990    [facility] Safety Plan      Creation Date: 4/23/24 Update Date: 4/23/25   Created By: SANFORD Mayo Last Updated By: SANFORD Mayo      Step 1: Warning Signs:   Warning Signs   Stop caring   more anger            Step 2: Internal Coping Strategies:   Internal Coping Strategies   talk with a friend            Step 3: People and social settings that provide distraction:   Name Contact Information   friends in cell    Places   my car           Step 4: People whom I can ask for help during a crisis:      Name Contact Information    friend in cell      Step 5: Professionals or agencies I can contact during a crisis:      Clinican/Agency Name Phone Emergency Contact    Ashvin Faust, Ph.D., [facility] [phone]       [facility] Emergency Department Emergency Department Phone Emergency Department Address    [facility]          Crisis Phone Numbers:   Suicide Prevention Lifeline: Call or Text  425 Crisis Text Line: Text HOME to 536-517   Please note: Some [facility] do not have a separate number for Child/Adolescent specific crisis. If your county is not listed under Child/Adolescent, please call the adult number for your county      Adult Crisis Numbers: Child/Adolescent Crisis Numbers   [facility]: [phone] [facility]: [phone]   [facility]: [phone] [facility]: [phone]   [facility]: [phone] [place]: [phone]   [facility]: [phone] [facility]: [phone]   [facility]: [phone]   [facility]: [phone]   [facility]: [phone]   [facility]: [phone] (daytime) [phone] (after hours, weekends, holidays)      Step 6: Making the environment safer (plan for lethal means safety):   Patient did not identify any lethal methods: Yes     Optional: What is most important to me and worth living for?      [place]  Safety Plan. Mamta Mitchell and Johnathan Hayes. Used with permission of the authors.

## 2024-04-23 NOTE — PSYCH
"Behavioral Health Psychotherapy Progress Note    Psychotherapy Provided: Individual Psychotherapy     1. Moderate recurrent major depression (HCC)            Goals addressed in session: Goal 1     DATA: Met with Gurpreet and made an initial Safety Plan. He spent the balance of session talking about his estranged wife and her behaviors which are confusing to him and his children with her showing a on again off again type of attitude and signals. He is worried for his children and one is affected and will be repeating her grade. He is attempting to go on dating sites but we explored that in order for him to have a healthy relationship with someone new, he needs to resolve the open problems in the present one otherwise any new relationship is destined to fail.  During this session, this clinician used the following therapeutic modalities: Client-centered Therapy    Substance Abuse was not addressed during this session. If the client is diagnosed with a co-occurring substance use disorder, please indicate any changes in the frequency or amount of use: . Stage of change for addressing substance use diagnoses: No substance use/Not applicable    ASSESSMENT:  Gurpreet Adames III presents with a Euthymic/ normal mood.     his affect is Normal range and intensity, which is congruent, with his mood and the content of the session. The client has made progress on their goals.     Gurpreet Adames III presents with a none risk of suicide, none risk of self-harm, and none risk of harm to others.    For any risk assessment that surpasses a \"low\" rating, a safety plan must be developed.    A safety plan was indicated: no  If yes, describe in detail None    PLAN: Between sessions, Gurpreet Adames III will meet with Dr. Canela for an eval. At the next session, the therapist will use Client-centered Therapy to address healthy relationships and coping skills.    Behavioral Health Treatment Plan and Discharge Planning: Gurpreet Adames III is " aware of and agrees to continue to work on their treatment plan. They have identified and are working toward their discharge goals. yes    Visit start and stop times:    04/23/24  Start Time: 1055  Stop Time: 1145  Total Visit Time: 50 minutes

## 2024-04-23 NOTE — PSYCH
Outpatient Behavioral Health Psychotherapy Treatment Plan    Gurpreet Adames III  1990     Date of Initial Psychotherapy Assessment: ***   Date of Current Treatment Plan: 04/23/24  Treatment Plan Target Date: ***  Treatment Plan Expiration Date: ***    Diagnosis:   1. Moderate recurrent major depression (HCC)            Area(s) of Need: ***    Long Term Goal 1 (in the client's own words): ***    Stage of Change: {SL AMB PSYCH STAGE OF CHANGE:93639}    Target Date for completion: ***     Anticipated therapeutic modalities: ***     People identified to complete this goal: ***      Objective 1: (identify the means of measuring success in meeting the objective): ***      Objective 2: (identify the means of measuring success in meeting the objective): ***      Long Term Goal 2 (in the client's own words): ***    Stage of Change: {SL AMB PSYCH STAGE OF CHANGE:52528}    Target Date for completion: ***     Anticipated therapeutic modalities: ***     People identified to complete this goal: ***      Objective 1: (identify the means of measuring success in meeting the objective): ***      Objective 2: (identify the means of measuring success in meeting the objective): ***     Long Term Goal 3 (in the client's own words): ***    Stage of Change: {SL AMB PSYCH STAGE OF CHANGE:17631}    Target Date for completion: ***     Anticipated therapeutic modalities: ***     People identified to complete this goal: ***      Objective 1: (identify the means of measuring success in meeting the objective): ***      Objective 2: (identify the means of measuring success in meeting the objective): ***     I am currently under the care of a [facility] psychiatric provider: {YES/NO:20200}    My [facility] psychiatric provider is: ***    I am currently taking psychiatric medications: {SL AMB TAKING PSYCH MEDS:78529}    I feel that I will be ready for discharge from mental health care when I reach the following (measurable goal/objective):  ***    For children and adults who have a legal guardian:   Has there been any change to custody orders and/or guardianship status? {Yes/No/NA:90155}. If yes, attach updated documentation.    I have { AMB PSYCH CREATED/UPDATED:63631} my Crisis Plan and have been offered a copy of this plan    Behavioral Health Treatment Plan [facility]: Diagnosis and Treatment Plan explained to Gurpreet Adames III {acknowledge/does not acknowledge:23370} an understanding of their diagnosis. Gurpreet Adames III { AMB PSYCH AGREE/DISAGREE:53369} this treatment plan.    I have been offered a copy of this Treatment Plan. {YES/NO:20200}

## 2024-04-26 ENCOUNTER — TELEPHONE (OUTPATIENT)
Dept: PSYCHIATRY | Facility: CLINIC | Age: 34
End: 2024-04-26

## 2024-04-26 NOTE — TELEPHONE ENCOUNTER
Left voicemail informing patient and/or parent/guardian of the Psych Encounter form needing to be signed as a requirement from the insurance company for billing purposes. Patient can access form via "[employer] and sign electronically.     Please make patient aware this form must be signed for each visit as a requirement to continue future visits with provider.

## 2024-05-03 ENCOUNTER — TELEPHONE (OUTPATIENT)
Dept: BEHAVIORAL/MENTAL HEALTH CLINIC | Facility: CLINIC | Age: 34
End: 2024-05-03

## 2024-05-09 ENCOUNTER — TELEPHONE (OUTPATIENT)
Dept: BEHAVIORAL/MENTAL HEALTH CLINIC | Facility: CLINIC | Age: 34
End: 2024-05-09

## 2024-05-09 ENCOUNTER — TELEMEDICINE (OUTPATIENT)
Dept: PSYCHIATRY | Facility: CLINIC | Age: 34
End: 2024-05-09
Payer: COMMERCIAL

## 2024-05-09 DIAGNOSIS — F33.1 MAJOR DEPRESSIVE DISORDER, RECURRENT, MODERATE (HCC): ICD-10-CM

## 2024-05-09 DIAGNOSIS — F41.1 GAD (GENERALIZED ANXIETY DISORDER): ICD-10-CM

## 2024-05-09 PROCEDURE — 99214 OFFICE O/P EST MOD 30 MIN: CPT | Performed by: STUDENT IN AN ORGANIZED HEALTH CARE EDUCATION/TRAINING PROGRAM

## 2024-05-09 RX ORDER — SERTRALINE HYDROCHLORIDE 100 MG/1
200 TABLET, FILM COATED ORAL DAILY
Qty: 60 TABLET | Refills: 2 | Status: SHIPPED | OUTPATIENT
Start: 2024-05-09 | End: 2024-08-07

## 2024-05-09 RX ORDER — SERTRALINE HYDROCHLORIDE 100 MG/1
200 TABLET, FILM COATED ORAL DAILY
Qty: 60 TABLET | Refills: 2 | Status: SHIPPED | OUTPATIENT
Start: 2024-05-09 | End: 2024-05-09 | Stop reason: SDUPTHER

## 2024-05-09 RX ORDER — CLONAZEPAM 1 MG/1
1 TABLET ORAL 2 TIMES DAILY
Qty: 60 TABLET | Refills: 2 | Status: SHIPPED | OUTPATIENT
Start: 2024-05-09 | End: 2024-05-09 | Stop reason: SDUPTHER

## 2024-05-09 RX ORDER — CLONAZEPAM 1 MG/1
1 TABLET ORAL 2 TIMES DAILY
Qty: 60 TABLET | Refills: 2 | Status: SHIPPED | OUTPATIENT
Start: 2024-05-09 | End: 2024-05-09

## 2024-05-09 RX ORDER — BUSPIRONE HYDROCHLORIDE 30 MG/1
30 TABLET ORAL 2 TIMES DAILY
Qty: 60 TABLET | Refills: 2 | Status: SHIPPED | OUTPATIENT
Start: 2024-05-09 | End: 2024-08-07

## 2024-05-09 RX ORDER — CLONAZEPAM 1 MG/1
1 TABLET ORAL 2 TIMES DAILY
Qty: 60 TABLET | Refills: 2 | Status: SHIPPED | OUTPATIENT
Start: 2024-05-09 | End: 2024-08-07

## 2024-05-09 NOTE — PSYCH
MEDICATION MANAGEMENT NOTE        [facility]      Name and Date of Birth:  Gurpreet Adames III 33 y.o. 1990 MRN: [id number]    Date of Visit: May 9, 2024    Reason for Visit: Follow-up visit for medication management     Virtual Visit Disclaimer:       TeleMed provider: Irene Ambrose D.O.    Location: [place]     Verification of patient location:     Patient is currently located in the [facility]  Patient is currently located in a state in which I am licensed     After connecting through [employer], the patient was identified by name and date of birth.  Gurpreet Adames III was informed that this is a telemedicine visit that is being conducted through [employer], and the patient was informed that this is a secure, HIPAA-compliant platform. My office door was closed. No one else was in the room. Gurpreet Adames III acknowledged consent and understanding of privacy and security of the video platform. Gurpreet understands that the online visit is based solely on information provided by the patient, and that, in the absence of a face-to-face physical evaluation by the physician, the diagnosis Gurpreet  receives is both limited and provisional in terms of accuracy and completeness. Gurpreet Adames III understands that they can discontinue the visit at any time. I informed Gurpreet that I have reviewed their record in EPIC and presented the opportunity for them to ask any questions regarding the visit today. Gurpreet Adames III voiced understanding and consented to these terms. Gurpreet is aware this is a billable service. Gurpreet is present in [place]      SUBJECTIVE:    Gurpreet Adames III is a 33 y.o. male with past psychiatric history significant for  who was personally seen and evaluated today at the [facility] outpatient clinic for follow-up and medication management. Gurpreet denies SI, HI, AVH, delusions, paddy at this time or since our last appointment.   Patient notes significantly decreased anxiety since her last appointment and after discussion of risks, benefits, side effects, alternatives, we will increase buspirone to 30 mg twice daily to continue to maximize its effect on patient's anxiety.  Patient remains engaged with personal hobbies, work and feels as if he is making progress in life.  He denies acute mental complaints or concerns at this time.        Current Rating Scores:     None completed today.    Review Of Systems:      Constitutional low energy   ENT nasal discharge   Cardiovascular negative   Respiratory cough   Gastrointestinal negative   Genitourinary negative   Musculoskeletal muscle aches   Integumentary negative   Neurological negative   Endocrine negative   Other Symptoms none, all other systems are negative       Past Psychiatric History: (unchanged information from previous note copied and italicized) - Information that is bolded has been updated.     See intake    Substance Abuse History: (unchanged information from previous note copied and italicized) - Information that is bolded has been updated.     See intake    Social History: (unchanged information from previous note copied and italicized) - Information that is bolded has been updated.     See intake    Traumatic History: (unchanged information from previous note copied and italicized) - Information that is bolded has been updated.     See intake      Past Medical History:    Past Medical History:   Diagnosis Date    Sleep apnea         No past surgical history on file.  Allergies   Allergen Reactions    Medical Tape Rash     Welts, from ekg patches       Substance Abuse History:    Social History     Substance and Sexual Activity   Alcohol Use Yes    Comment: social     Social History     Substance and Sexual Activity   Drug Use No       Social History:    Social History     Socioeconomic History    Marital status: Legally      Spouse name: Not on file    Number of  children: Not on file    Years of education: Not on file    Highest education level: Not on file   Occupational History    Not on file   Tobacco Use    Smoking status: Every Day     Types: Cigarettes    Smokeless tobacco: Never   Substance and Sexual Activity    Alcohol use: Yes     Comment: social    Drug use: No    Sexual activity: Yes     Partners: Female   Other Topics Concern    Not on file   Social History Narrative    Not on file     Social Determinants of Health     Financial Resource Strain: High Risk (3/11/2024)    Received from [facility]    Overall Financial Resource Strain (CARDIA)     Difficulty of Paying Living Expenses: Very hard   Food Insecurity: Food Insecurity Present (3/11/2024)    Received from [facility]    Hunger Vital Sign     Worried About Running Out of Food in the Last Year: Sometimes true     Ran Out of Food in the Last Year: Often true   Transportation Needs: No Transportation Needs (3/11/2024)    Received from [facility]    PRAPARE - Transportation     Lack of Transportation (Medical): No     Lack of Transportation (Non-Medical): No   Physical Activity: Insufficiently Active (2/5/2023)    Received from [facility]    Exercise Vital Sign     Days of Exercise per Week: 3 days     Minutes of Exercise per Session: 10 min   Stress: Stress Concern Present (3/11/2024)    Received from [facility]    [employer] - Occupational Stress Questionnaire     Feeling of Stress : Rather much   Social Connections: Feeling Somewhat Isolated (3/11/2024)    Received from [facility]    OASIS : Social Isolation     How often do you feel lonely or isolated from those around you?: Sometimes   Intimate Partner Violence: Not At Risk (3/11/2024)    Received from [facility]    Humiliation, Afraid, Rape, and Kick questionnaire     Fear of Current or  "Ex-Partner: No     Emotionally Abused: No     Physically Abused: No     Sexually Abused: No   Housing Stability: High Risk (3/11/2024)    Received from [facility]    Housing Stability Vital Sign     Unable to Pay for Housing in the Last Year: Yes     Number of Places Lived in the Last Year: 2     Unstable Housing in the Last Year: Yes       Family Psychiatric History:     No family history on file.    History Review: The following portions of the patient's history were reviewed and updated as appropriate: allergies, current medications, past family history, past medical history, past social history, past surgical history, and problem list.         OBJECTIVE:     Vital signs in last 24 hours:    There were no vitals filed for this visit.    Mental Status Evaluation:    Appearance age appropriate, casually dressed   Behavior cooperative, calm   Speech normal rate, normal volume, normal pitch   Mood \"Better\"   Affect constricted   Thought Processes organized, goal directed   Associations intact associations   Thought Content no overt delusions   Perceptual Disturbances: no auditory hallucinations, no visual hallucinations   Abnormal Thoughts  Risk Potential Suicidal ideation - None at present  Homicidal ideation - None at present  Potential for aggression - No   Orientation oriented to person, place, time/date, and situation   Memory recent and remote memory grossly intact   Consciousness alert and awake   Attention Span Concentration Span attention span and concentration are age appropriate   Intellect appears to be of average intelligence   Insight intact   Judgement intact   Muscle Strength and  Gait unable to assess today due to virtual visit   Motor activity unable to assess today due to virtual visit   Language no difficulty naming common objects, no difficulty repeating a phrase   Fund of Knowledge adequate knowledge of current events  adequate fund of knowledge regarding past history  adequate " fund of knowledge regarding vocabulary    Pain mild   Pain Scale Did not ask patient to formally rate       Laboratory Results: I have personally reviewed all pertinent laboratory/tests results    Recent Labs (last 2 months):   No visits with results within 2 Month(s) from this visit.   Latest known visit with results is:   Admission on 02/09/2024, Discharged on 02/09/2024   Component Date Value    Amph/Meth UR 02/09/2024 Negative     Barbiturate Ur 02/09/2024 Negative     Benzodiazepine Urine 02/09/2024 Positive (A)     Cocaine Urine 02/09/2024 Negative     Methadone Urine 02/09/2024 Negative     Opiate Urine 02/09/2024 Negative     PCP Ur 02/09/2024 Negative     THC Urine 02/09/2024 Negative     Oxycodone Urine 02/09/2024 Negative     EXTBreath Alcohol 02/09/2024 0.000        Suicide/Homicide Risk Assessment:    Risk of Harm to Self:  The following ratings are based on assessment at the time of the interview  Historical Risk Factors include: chronic psychiatric problems  Protective Factors: no current suicidal ideation, access to mental health treatment, compliant with medications, compliant with mental health treatment, connection to own children, having a desire to be alive    Risk of Harm to Others:  The following ratings are based on assessment at the time of the interview  Historical Risk Factors include: history of aggressive behavior.  Protective Factors: no current homicidal ideation, compliant with medications, compliant with mental health treatment, connection to own children    The following interventions are recommended: contracts for safety at present - agrees to go to ED if feeling unsafe, contracts for safety at present - agrees to call Crisis Intervention Service if feeling unsafe      Lethality Statement:    Based on today's assessment and clinical criteria, Frandy Adames III contracts for safety and is not an imminent risk of harm to self or others. Outpatient level of care is deemed appropriate  at this current time. Gurpreet understands that if they can no longer contract for safety, they need to call the office or report to their nearest Emergency Room for immediate evaluation. They voiced understanding and agreement to call 911 or head to the nearest ED should they have any physical or mental decompensation whatsoever.       Assessment/Plan:     1.)  MDD, recurrent, moderate  2.)  Agoraphobia with panic  3.)  TONIE      After discussion of risks and benefits, the side effects, alternatives, we will  increase buspirone to 30 mg twice daily.  Rest of regimen remains the same and patient will continue to meet with his therapist      Medications Risks/Benefits      Risks, Benefits And Possible Side Effects Of Medications:    Risks, benefits, and possible side effects of medications explained to Gurpreet and he verbalizes understanding and agreement for treatment.    Controlled Medication Discussion:     Gurpreet has been filling controlled prescriptions on time as prescribed according to [place] Prescription Drug Monitoring Program    Psychotherapy Provided:     Individual psychotherapy provided: Crisis/safety plan discussed with Gurpreet.     Treatment Plan:    Completed and signed during the session: Not applicable - Treatment Plan to be completed by [facility] therapist      Visit Time    Visit Start Time: 10:00 AM  Visit Stop Time: 10:15 AM  Total Visit Duration:  15 minutes     The total visit duration detailed above includes: patient engagement, medication management, psychotherapy/counseling, discussion regarding treatment goals, documentation, review of past medical records, and coordination of care.      Note Share Disclaimer:     This note was not shared with the patient due to reasonable likelihood of causing patient harm      Irene Ambrose DO  Psychiatry  05/09/24

## 2024-05-09 NOTE — TELEPHONE ENCOUNTER
Left voicemail informing patient and/or parent/guardian of the Psych Encounter form needing to be signed as a requirement from the insurance company for billing purposes. Patient can access form via [employer] and sign electronically.     Please make patient aware this form must be signed for each visit as a requirement to continue future visits with provider.

## 2024-05-21 ENCOUNTER — TELEMEDICINE (OUTPATIENT)
Dept: BEHAVIORAL/MENTAL HEALTH CLINIC | Facility: CLINIC | Age: 34
End: 2024-05-21
Payer: COMMERCIAL

## 2024-05-21 DIAGNOSIS — F41.1 GAD (GENERALIZED ANXIETY DISORDER): Primary | ICD-10-CM

## 2024-05-21 PROCEDURE — 90834 PSYTX W PT 45 MINUTES: CPT | Performed by: COUNSELOR

## 2024-05-21 NOTE — PSYCH
"Behavioral Health Psychotherapy Progress Note    Psychotherapy Provided: Individual Psychotherapy     1. TONIE (generalized anxiety disorder)            Goals addressed in session: Goal 1     DATA: Met with Gurpreet and created a Tx plan identifying possible custody for his children and maintaining of his mental health. The balance of session after that was spent on his interaction with his ex and her lack of interest with the children failing school and her bringing in other men into the home.  During this session, this clinician used the following therapeutic modalities: Client-centered Therapy    Substance Abuse was not addressed during this session. If the client is diagnosed with a co-occurring substance use disorder, please indicate any changes in the frequency or amount of use: Stage of change for addressing substance use diagnoses: No substance use/Not applicable    ASSESSMENT:  Gurpreet Adames III presents with a Euthymic/ normal mood.     his affect is Normal range and intensity, which is congruent, with his mood and the content of the session. The client has made progress on their goals.     Gurpreet Adames III presents with a none risk of suicide, none risk of self-harm, and none risk of harm to others.    For any risk assessment that surpasses a \"low\" rating, a safety plan must be developed.    A safety plan was indicated: no  If yes, describe in detail N/A    PLAN: Between sessions, Gurpreet Adames III will sign Tx plan and encounter form along with communicating with his . At the next session, the therapist will use Client-centered Therapy to address developing clear defined boundaries with ex-wife.    Behavioral Health Treatment Plan and Discharge Planning: Gurpreet Adames III is aware of and agrees to continue to work on their treatment plan. They have identified and are working toward their discharge goals. yes    Visit start and stop times:    05/21/24     "

## 2024-05-21 NOTE — BH TREATMENT PLAN
Outpatient Behavioral Health Psychotherapy Treatment Plan    Gurpreet Adames III  1990     Date of Initial Psychotherapy Assessment: 4/11/2024   Date of Current Treatment Plan: 05/21/24  Treatment Plan Target Date: 11/21/2024  Treatment Plan Expiration Date: 11/21/2024    Diagnosis:   1. TONIE (generalized anxiety disorder)            Area(s) of Need: Custody of children, Stabilization of Mental Health,     Long Term Goal 1 (in the client's own words): I want to either have custody of my children or more visitation    Stage of Change: Preparation    Target Date for completion: 11/20/2024     Anticipated therapeutic modalities: Court, Client, ex-spouse     People identified to complete this goal: Court, Client, ex-spouse      Objective 1: (identify the means of measuring success in meeting the objective): Client will monitor Childrens welfare to determine custody       Objective 2: (identify the means of measuring success in meeting the objective): Meet with  as needed for custody, and support,         Long Term Goal 2 (in the client's own words): I want to be stable with my mental health and meds    Stage of Change: Action    Target Date for completion: ongoing     Anticipated therapeutic modalities: Dr. Canela, Therapist,     People identified to complete this goal: Dr. Canela, Therapist,      Objective 1: (identify the means of measuring success in meeting the objective): Meet with Psychiatrist as scheduled      Objective 2: (identify the means of measuring success in meeting the objective): Take meds as required        I am currently under the care of a [facility] psychiatric provider: yes    My [facility] psychiatric provider is: Dr. Canela    I am currently taking psychiatric medications: Yes, as prescribed    I feel that I will be ready for discharge from mental health care when I reach the following (measurable goal/objective): When I feel stable    For children and adults who have a legal  guardian:   Has there been any change to custody orders and/or guardianship status? NA. If yes, attach updated documentation.    I have created my Crisis Plan and have been offered a copy of this plan    Behavioral Health Treatment Plan [facility]: Diagnosis and Treatment Plan explained to Gurpreet Adames III acknowledges an understanding of their diagnosis. Gurpreet Adames III agrees to this treatment plan.    I have been offered a copy of this Treatment Plan. yes

## 2024-06-04 ENCOUNTER — TELEMEDICINE (OUTPATIENT)
Dept: BEHAVIORAL/MENTAL HEALTH CLINIC | Facility: CLINIC | Age: 34
End: 2024-06-04
Payer: COMMERCIAL

## 2024-06-04 ENCOUNTER — TELEMEDICINE (OUTPATIENT)
Dept: PSYCHIATRY | Facility: CLINIC | Age: 34
End: 2024-06-04
Payer: COMMERCIAL

## 2024-06-04 DIAGNOSIS — F41.1 GAD (GENERALIZED ANXIETY DISORDER): ICD-10-CM

## 2024-06-04 DIAGNOSIS — F33.1 MODERATE RECURRENT MAJOR DEPRESSION (HCC): Primary | ICD-10-CM

## 2024-06-04 DIAGNOSIS — F33.1 MAJOR DEPRESSIVE DISORDER, RECURRENT, MODERATE (HCC): ICD-10-CM

## 2024-06-04 PROCEDURE — 90834 PSYTX W PT 45 MINUTES: CPT | Performed by: COUNSELOR

## 2024-06-04 PROCEDURE — 99214 OFFICE O/P EST MOD 30 MIN: CPT | Performed by: STUDENT IN AN ORGANIZED HEALTH CARE EDUCATION/TRAINING PROGRAM

## 2024-06-04 RX ORDER — SERTRALINE HYDROCHLORIDE 100 MG/1
200 TABLET, FILM COATED ORAL DAILY
Qty: 60 TABLET | Refills: 2 | Status: SHIPPED | OUTPATIENT
Start: 2024-06-04 | End: 2024-09-02

## 2024-06-04 RX ORDER — CLONAZEPAM 1 MG/1
1 TABLET ORAL 2 TIMES DAILY
Qty: 60 TABLET | Refills: 2 | Status: SHIPPED | OUTPATIENT
Start: 2024-06-04 | End: 2024-09-02

## 2024-06-04 RX ORDER — BUSPIRONE HYDROCHLORIDE 30 MG/1
30 TABLET ORAL 2 TIMES DAILY
Qty: 60 TABLET | Refills: 2 | Status: SHIPPED | OUTPATIENT
Start: 2024-06-04 | End: 2024-09-02

## 2024-06-04 RX ORDER — BUPROPION HYDROCHLORIDE 150 MG/1
150 TABLET ORAL DAILY
Qty: 30 TABLET | Refills: 2 | Status: SHIPPED | OUTPATIENT
Start: 2024-06-04 | End: 2024-09-02

## 2024-06-04 NOTE — PSYCH
MEDICATION MANAGEMENT NOTE        [facility]      Name and Date of Birth:  Gurpreet Adames III 33 y.o. 1990 MRN: [id number]    Date of Visit: June 4, 2024    Reason for Visit: Follow-up visit for medication management     Virtual Visit Disclaimer:       TeleMed provider: Irene Ambrose D.O.    Location: [place]     Verification of patient location:     Patient is currently located in the [facility]  Patient is currently located in a state in which I am licensed     After connecting through [employer], the patient was identified by name and date of birth.  Gurpreet Adames III was informed that this is a telemedicine visit that is being conducted through [employer] (formerly [employer]), and the patient was informed that this is a secure, HIPAA-compliant platform. My office door was closed. No one else was in the room. Gurpreet Adames III acknowledged consent and understanding of privacy and security of the video platform. Gurpreet understands that the online visit is based solely on information provided by the patient, and that, in the absence of a face-to-face physical evaluation by the physician, the diagnosis Gurpreet  receives is both limited and provisional in terms of accuracy and completeness. Gurpreet Adames III understands that they can discontinue the visit at any time. I informed Gurpreet that I have reviewed their record in EP[facility] and presented the opportunity for them to ask any questions regarding the visit today. Gurpreet Adames III voiced understanding and consented to these terms. Gurpreet is aware this is a billable service. Gurpreet is present in [place]      SUBJECTIVE:    Gurpreet Adames III is a 33 y.o. male with past psychiatric history significant for  who was personally seen and evaluated today at the [facility] outpatient clinic for follow-up and medication management. Gurpreet denies SI, HI, AVH, delusions, paddy at this time or since our last appointment.   Patient continues to note improved mood and decreased feelings of depression and anxiety.  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  He denies acute mental complaints or concerns at this time and after discussion of risks, benefits, potential side effects, alternatives, he would like to stay on current regimen as is without any changes due to its efficacy.      Current Rating Scores:     None completed today.    Review Of Systems:      Constitutional Negative   ENT Negative   Cardiovascular negative   Respiratory Negative   Gastrointestinal negative   Genitourinary negative   Musculoskeletal Negative   Integumentary negative   Neurological negative   Endocrine negative   Other Symptoms none, all other systems are negative       Past Psychiatric History: (unchanged information from previous note copied and italicized) - Information that is bolded has been updated.     See intake    Substance Abuse History: (unchanged information from previous note copied and italicized) - Information that is bolded has been updated.     See intake    Social History: (unchanged information from previous note copied and italicized) - Information that is bolded has been updated.     See intake    Traumatic History: (unchanged information from previous note copied and italicized) - Information that is bolded has been updated.     See intake      Past Medical History:    Past Medical History:   Diagnosis Date    Sleep apnea         No past surgical history on file.  Allergies   Allergen Reactions    Medical Tape Rash     Welts, from ekg patches       Substance Abuse History:    Social History     Substance and Sexual Activity   Alcohol Use Yes    Comment: social     Social History     Substance and Sexual Activity   Drug Use No       Social History:    Social History     Socioeconomic History    Marital status: Legally      Spouse name: Not on file     Number of children: Not on file    Years of education: Not on file    Highest education level: Not on file   Occupational History    Not on file   Tobacco Use    Smoking status: Every Day     Types: Cigarettes    Smokeless tobacco: Never   Substance and Sexual Activity    Alcohol use: Yes     Comment: social    Drug use: No    Sexual activity: Yes     Partners: Female   Other Topics Concern    Not on file   Social History Narrative    Not on file     Social Determinants of Health     Financial Resource Strain: High Risk (3/11/2024)    [facility]    Overall Financial Resource Strain (CARDIA)     Difficulty of Paying Living Expenses: Very hard   Food Insecurity: Food Insecurity Present (3/11/2024)    [facility]    Hunger Vital Sign     Worried About Running Out of Food in the Last Year: Sometimes true     Ran Out of Food in the Last Year: Often true   Transportation Needs: No Transportation Needs (3/11/2024)    [facility]    PRAPARE - Transportation     Lack of Transportation (Medical): No     Lack of Transportation (Non-Medical): No   Physical Activity: Insufficiently Active (2/5/2023)    Received from [facility]    Exercise Vital Sign     Days of Exercise per Week: 3 days     Minutes of Exercise per Session: 10 min   Stress: Stress Concern Present (3/11/2024)    [facility]    [employer] - Occupational Stress Questionnaire     Feeling of Stress : Rather much   Social Connections: Feeling Somewhat Isolated (3/11/2024)    [facility]    OASIS : Social Isolation     How often do you feel lonely or isolated from those around you?: Sometimes   Intimate Partner  "Violence: Not At Risk (3/11/2024)    [facility]    Humiliation, Afraid, Rape, and Kick questionnaire     Fear of Current or Ex-Partner: No     Emotionally Abused: No     Physically Abused: No     Sexually Abused: No   Housing Stability: High Risk (3/11/2024)    [facility]    Housing Stability Vital Sign     Unable to Pay for Housing in the Last Year: Yes     Number of Places Lived in the Last Year: 2     Unstable Housing in the Last Year: Yes       Family Psychiatric History:     No family history on file.    History Review: The following portions of the patient's history were reviewed and updated as appropriate: allergies, current medications, past family history, past medical history, past social history, past surgical history, and problem list.         OBJECTIVE:     Vital signs in last 24 hours:    There were no vitals filed for this visit.    Mental Status Evaluation:    Appearance age appropriate, casually dressed   Behavior cooperative, calm   Speech normal rate, normal volume, normal pitch   Mood \"Good\"   Affect Euthymic   Thought Processes organized, goal directed   Associations intact associations   Thought Content no overt delusions   Perceptual Disturbances: no auditory hallucinations, no visual hallucinations   Abnormal Thoughts  Risk Potential Suicidal ideation - None at present  Homicidal ideation - None at present  Potential for aggression - No   Orientation oriented to person, place, time/date, and situation   Memory recent and remote memory grossly intact   Consciousness alert and awake   Attention Span Concentration Span attention span and concentration are age appropriate   Intellect appears to be of average intelligence   Insight intact   Judgement intact   Muscle Strength and  Gait unable to assess today due to virtual visit   Motor activity unable to assess today due to " virtual visit   Language no difficulty naming common objects, no difficulty repeating a phrase   Fund of Knowledge adequate knowledge of current events  adequate fund of knowledge regarding past history  adequate fund of knowledge regarding vocabulary    Pain mild   Pain Scale Did not ask patient to formally rate       Laboratory Results: I have personally reviewed all pertinent laboratory/tests results    Recent Labs (last 2 months):   No visits with results within 2 Month(s) from this visit.   Latest known visit with results is:   Admission on 02/09/2024, Discharged on 02/09/2024   Component Date Value    Amph/Meth UR 02/09/2024 Negative     Barbiturate Ur 02/09/2024 Negative     Benzodiazepine Urine 02/09/2024 Positive (A)     Cocaine Urine 02/09/2024 Negative     Methadone Urine 02/09/2024 Negative     Opiate Urine 02/09/2024 Negative     PCP Ur 02/09/2024 Negative     THC Urine 02/09/2024 Negative     Oxycodone Urine 02/09/2024 Negative     EXTBreath Alcohol 02/09/2024 0.000        Suicide/Homicide Risk Assessment:    Risk of Harm to Self:  The following ratings are based on assessment at the time of the interview  Historical Risk Factors include: chronic psychiatric problems  Protective Factors: no current suicidal ideation, access to mental health treatment, compliant with medications, compliant with mental health treatment, connection to own children, having a desire to be alive    Risk of Harm to Others:  The following ratings are based on assessment at the time of the interview  Historical Risk Factors include: history of aggressive behavior.  Protective Factors: no current homicidal ideation, compliant with medications, compliant with mental health treatment, connection to own children    The following interventions are recommended: contracts for safety at present - agrees to go to ED if feeling unsafe, contracts for safety at present - agrees to call Crisis Intervention Service if feeling  unsafe      Lethality Statement:    Based on today's assessment and clinical criteria, Gurpreet Adames III contracts for safety and is not an imminent risk of harm to self or others. Outpatient level of care is deemed appropriate at this current time. Gurpreet understands that if they can no longer contract for safety, they need to call the office or report to their nearest Emergency Room for immediate evaluation. They voiced understanding and agreement to call 911 or head to the nearest ED should they have any physical or mental decompensation whatsoever.       Assessment/Plan:     1.)  MDD, recurrent, moderate  2.)  Agoraphobia with panic  3.)  TONIE      After discussion of risks and benefits, the side effects, alternatives, we will continue current regimen as is without any changes.  Patient will continue to take bupropion 150 mg daily, buspirone 30 mg twice daily, Klonopin 1 mg twice daily, sertraline 200 mg daily.  After 6 months or so we will decrease clonazepam to 0.5 mg twice daily with the intent to eventually taper patient off of clonazepam      Medications Risks/Benefits      Risks, Benefits And Possible Side Effects Of Medications:    Risks, benefits, and possible side effects of medications explained to Gurpreet and he verbalizes understanding and agreement for treatment.    Controlled Medication Discussion:     Gurpreet has been filling controlled prescriptions on time as prescribed according to [place] Prescription Drug Monitoring Program    Psychotherapy Provided:     Individual psychotherapy provided: Crisis/safety plan discussed with Gurpreet.     Treatment Plan:    Completed and signed during the session: Not applicable - Treatment Plan to be completed by [facility] therapist      Visit Time    Visit Start Time: 10:00 AM  Visit Stop Time: 10:15 AM  Total Visit Duration:  15 minutes     The total visit duration detailed above includes: patient engagement, medication management,  psychotherapy/counseling, discussion regarding treatment goals, documentation, review of past medical records, and coordination of care.      Note Share Disclaimer:     This note was not shared with the patient due to reasonable likelihood of causing patient harm      Irene Ambrose DO  Psychiatry  06/04/24

## 2024-06-04 NOTE — PSYCH
"Behavioral Health Psychotherapy Progress Note    Psychotherapy Provided: Individual Psychotherapy     No diagnosis found.    Goals addressed in session: Goal 1     DATA: Met with Gurpreet and processed how his dynamics with relationships with the opposite has changed. However, despite the fact his estranged spouse continues to exhibit innappropriate behavior with drinking and picking up men at thew bar to bring home, he still entertains the idea that the harm can be repaired. We explored his wife's behavior and how she has a Bi-polar D/O, but is not on medications and continues to wax and wane with the same behaviors. This continues to affect the children where he had a school meeting because his youngest was at golden of failing. He reports his wife rationalized this by saying a lot of  kids come from broken homes and she'll be fine. We looked at the present moment where she is not fine and that type of dismissive behavior needs to be challenged.    During this session, this clinician used the following therapeutic modalities: Client-centered Therapy    Substance Abuse was not addressed during this session. If the client is diagnosed with a co-occurring substance use disorder, please indicate any changes in the frequency or amount of use: Stage of change for addressing substance use diagnoses: No substance use/Not applicable    ASSESSMENT:  Guprreet Adames III presents with a Euthymic/ normal mood.     his affect is Normal range and intensity, which is congruent, with his mood and the content of the session. The client has made progress on their goals.     Gurpreet Adames III presents with a none risk of suicide, none risk of self-harm, and none risk of harm to others.    For any risk assessment that surpasses a \"low\" rating, a safety plan must be developed.    A safety plan was indicated: no  If yes, describe in detail N/A    PLAN: Between sessions, Gurpreet Adames III will continue to vince at developing a contract of what " he and his wife need to do to change and develop better boundaries and self-care. At the next session, the therapist will use Client-centered Therapy to address self-care.    Behavioral Health Treatment Plan and Discharge Planning: Gurpreet Adames III is aware of and agrees to continue to work on their treatment plan. They have identified and are working toward their discharge goals. yes    Visit start and stop times:    06/04/24

## 2024-06-07 ENCOUNTER — TELEPHONE (OUTPATIENT)
Dept: PSYCHIATRY | Facility: CLINIC | Age: 34
End: 2024-06-07

## 2024-06-07 ENCOUNTER — DOCUMENTATION (OUTPATIENT)
Dept: BEHAVIORAL/MENTAL HEALTH CLINIC | Facility: CLINIC | Age: 34
End: 2024-06-07

## 2024-06-07 NOTE — PROGRESS NOTES
TRANSFER SUMMARY    [facility]    Patient Name Gurpreet Adames III     Date of Birth: 33 y.o. 1990      MRN: [id number]    Admission Date:  4/11/2024    Date of Transfer: June 7, 2024    Admission Diagnosis:     Major Depressive Disorder  Generalized Anxiety Disorder    Current Diagnosis:     No diagnosis found.    Reason for Admission: Gurpreet presented for treatment due to depressive symptoms and anxiety symptoms. Primary complaints included DEPRESSIVE SYMPTOMS: unremarkable - euthymic mood and ANXIETY SYMPTOMS: unremarkable.     Progress in Treatment: Gurpreet was seen for Medication Management with Psychotherapy. During the course of treatment he attended all sessions with therapist and psych Dr.    Episodes of Higher Level of Care: No    Transfer request Initiated by: Psychiatrist: None Therapist:  Ashvin Faust, Ph.D.,[facility]    Reason for Transfer Request: clinician leaving practice    Does this individual need a clinician with specialized training/expertise?: No    Is this client [facility]ists? Psychiatrist: None Therapist: None    Other pertinent issues: None    Are there any specific individuals who would be a “best fit” or who have already agreed to accept this transfer request?      Psychiatrist: None   Therapist: None  Rationale:  N/A    Attempts to maintain the current therapeutic relationship: No    Transfer request routed to Clinical Coordinator for input and/or approval.     Comments from other involved providers and/or clinical coordinator : None    Ashvin Faust, 06/07/24

## 2024-06-11 ENCOUNTER — TELEPHONE (OUTPATIENT)
Dept: PSYCHIATRY | Facility: CLINIC | Age: 34
End: 2024-06-11

## 2024-06-18 ENCOUNTER — APPOINTMENT (EMERGENCY)
Dept: RADIOLOGY | Facility: HOSPITAL | Age: 34
End: 2024-06-18
Payer: COMMERCIAL

## 2024-06-18 ENCOUNTER — HOSPITAL ENCOUNTER (EMERGENCY)
Facility: HOSPITAL | Age: 34
Discharge: HOME | End: 2024-06-19
Attending: EMERGENCY MEDICINE | Admitting: EMERGENCY MEDICINE
Payer: COMMERCIAL

## 2024-06-18 VITALS
TEMPERATURE: 97.9 F | DIASTOLIC BLOOD PRESSURE: 82 MMHG | SYSTOLIC BLOOD PRESSURE: 166 MMHG | HEART RATE: 76 BPM | RESPIRATION RATE: 18 BRPM | OXYGEN SATURATION: 96 %

## 2024-06-18 DIAGNOSIS — M79.5 FOREIGN BODY (FB) IN SOFT TISSUE: Primary | ICD-10-CM

## 2024-06-18 PROCEDURE — 10120 INC&RMVL FB SUBQ TISS SMPL: CPT | Performed by: PHYSICIAN ASSISTANT

## 2024-06-18 PROCEDURE — 73140 X-RAY EXAM OF FINGER(S): CPT

## 2024-06-18 PROCEDURE — 99284 EMERGENCY DEPT VISIT MOD MDM: CPT | Performed by: PHYSICIAN ASSISTANT

## 2024-06-18 PROCEDURE — 99283 EMERGENCY DEPT VISIT LOW MDM: CPT

## 2024-06-18 RX ORDER — CEPHALEXIN 250 MG/1
500 CAPSULE ORAL ONCE
Status: COMPLETED | OUTPATIENT
Start: 2024-06-18 | End: 2024-06-18

## 2024-06-18 RX ORDER — LIDOCAINE HYDROCHLORIDE 10 MG/ML
10 INJECTION, SOLUTION EPIDURAL; INFILTRATION; INTRACAUDAL; PERINEURAL ONCE
Status: COMPLETED | OUTPATIENT
Start: 2024-06-18 | End: 2024-06-18

## 2024-06-18 RX ORDER — CEPHALEXIN 500 MG/1
500 CAPSULE ORAL 3 TIMES DAILY
Qty: 21 CAPSULE | Refills: 0 | Status: SHIPPED | OUTPATIENT
Start: 2024-06-18 | End: 2024-06-25

## 2024-06-18 RX ORDER — CHLORHEXIDINE GLUCONATE 40 MG/ML
1 SOLUTION TOPICAL DAILY PRN
Qty: 236 ML | Refills: 0 | Status: SHIPPED | OUTPATIENT
Start: 2024-06-18 | End: 2024-06-25

## 2024-06-18 RX ADMIN — LIDOCAINE HYDROCHLORIDE 10 ML: 10 INJECTION, SOLUTION EPIDURAL; INFILTRATION; INTRACAUDAL; PERINEURAL at 23:47

## 2024-06-18 RX ADMIN — CEPHALEXIN 500 MG: 250 CAPSULE ORAL at 23:04

## 2024-06-19 NOTE — ED PROVIDER NOTES
History  Chief Complaint   Patient presents with    Finger Injury     Pt thinks he has glass in finger and its putting pressure on his nail, pt tried to self extract, pt reports glass in middle finger of left hand      Patient is a 33-year-old male with past medical history significant for fatty liver disease, MILTON, GERD who presents for evaluation of glass in his left long finger.  He states yesterday he was picking up a broken bottle and a piece of glass embedded itself in his fingertip.  He states he tried to remove it earlier today and noted some purulence coming from the area.  He still has a foreign body sensation and feels pressure behind his nail.  He denies fever, chills, nausea, vomiting, fatigue or any other constitutional symptoms.      Foreign Body in Skin  Associated symptoms: no abdominal pain, no cough, no ear pain, no sore throat and no vomiting        Prior to Admission Medications   Prescriptions Last Dose Informant Patient Reported? Taking?   buPROPion (Wellbutrin XL) 150 mg 24 hr tablet   No No   Sig: Take 1 tablet (150 mg total) by mouth daily   busPIRone (BUSPAR) 30 MG tablet   No No   Sig: Take 1 tablet (30 mg total) by mouth 2 (two) times a day   clonazePAM (KlonoPIN) 1 mg tablet   No No   Sig: Take 1 tablet (1 mg total) by mouth 2 (two) times a day   famotidine (PEPCID) 20 mg tablet   Yes No   Sig: Take 20 mg by mouth daily at bedtime   pantoprazole (PROTONIX) 40 mg tablet   No No   Sig: Take 1 tablet (40 mg total) by mouth daily for 14 days   sertraline (ZOLOFT) 100 mg tablet   No No   Sig: Take 2 tablets (200 mg total) by mouth daily      Facility-Administered Medications: None       Past Medical History:   Diagnosis Date    Fatty liver     Sleep apnea        No past surgical history on file.    No family history on file.  I have reviewed and agree with the history as documented.    E-Cigarette/Vaping     E-Cigarette/Vaping Substances     Social History     Tobacco Use    Smoking status:  Every Day     Types: Cigarettes    Smokeless tobacco: Never   Substance Use Topics    Alcohol use: Yes     Comment: social    Drug use: No       Review of Systems   Constitutional: Negative.  Negative for chills and fever.   HENT: Negative.  Negative for ear pain and sore throat.    Eyes: Negative.  Negative for pain and visual disturbance.   Respiratory: Negative.  Negative for cough and shortness of breath.    Cardiovascular: Negative.  Negative for chest pain and palpitations.   Gastrointestinal: Negative.  Negative for abdominal pain and vomiting.   Endocrine: Negative.    Genitourinary: Negative.  Negative for dysuria and hematuria.   Musculoskeletal: Negative.  Negative for arthralgias and back pain.   Skin:  Negative for color change and rash.   Allergic/Immunologic: Negative.    Neurological: Negative.  Negative for seizures and syncope.   Hematological: Negative.    Psychiatric/Behavioral: Negative.     All other systems reviewed and are negative.      Physical Exam  Physical Exam  Vitals and nursing note reviewed.   Constitutional:       General: He is not in acute distress.     Appearance: Normal appearance. He is not ill-appearing, toxic-appearing or diaphoretic.   HENT:      Head: Normocephalic and atraumatic.      Right Ear: External ear normal.      Left Ear: External ear normal.      Nose: Nose normal.   Eyes:      General: No scleral icterus.     Conjunctiva/sclera: Conjunctivae normal.   Cardiovascular:      Rate and Rhythm: Normal rate and regular rhythm.      Pulses: Normal pulses.   Pulmonary:      Effort: Pulmonary effort is normal. No respiratory distress.   Musculoskeletal:         General: Tenderness and signs of injury present.        Hands:    Skin:     General: Skin is warm and dry.      Capillary Refill: Capillary refill takes less than 2 seconds.      Findings: Erythema present.   Neurological:      General: No focal deficit present.      Mental Status: He is alert and oriented to  person, place, and time.   Psychiatric:         Mood and Affect: Mood normal.         Behavior: Behavior normal.         Vital Signs  ED Triage Vitals   Temperature Pulse Respirations Blood Pressure SpO2   06/18/24 2245 06/18/24 2245 06/18/24 2245 06/18/24 2246 06/18/24 2245   97.9 °F (36.6 °C) 76 18 166/82 96 %      Temp Source Heart Rate Source Patient Position - Orthostatic VS BP Location FiO2 (%)   06/18/24 2245 -- -- -- --   Temporal          Pain Score       --                  Vitals:    06/18/24 2245 06/18/24 2246   BP:  166/82   Pulse: 76          Visual Acuity      ED Medications  Medications   lidocaine (PF) (XYLOCAINE-MPF) 1 % injection 10 mL (10 mL Infiltration Given by Other 6/18/24 2347)   cephalexin (KEFLEX) capsule 500 mg (500 mg Oral Given 6/18/24 2304)       Diagnostic Studies  Results Reviewed       None                   XR finger third digit-middle LEFT   ED Interpretation by Nusrat Spencer PA-C (06/18 2325)                 Procedures  Foreign Body - Embedded    Date/Time: 6/18/2024 11:57 PM    Performed by: Nusrat Spencer PA-C  Authorized by: Nusrat Spencer PA-C    Patient location:  ED  Consent:     Consent obtained:  Verbal    Consent given by:  Patient    Risks discussed:  Bleeding, infection, pain, worsening of condition, poor cosmetic result, nerve damage and incomplete removal    Alternatives discussed:  No treatment  Universal protocol:     Patient identity confirmed:  Verbally with patient  Location:     Location:  Finger    Finger location:  L middle finger    Depth:  Subcutaneous    Tendon involvement:  None  Pre-procedure details:     Imaging:  X-ray    Neurovascular status: intact      Preparation: Patient was prepped and draped in usual sterile fashion    Anesthesia (see MAR for exact dosages):     Anesthesia method:  Nerve block    Block location:  Digital block    Block anesthetic:  Lidocaine 1% w/o epi    Block injection procedure:  Anatomic  "landmarks identified, introduced needle, incremental injection, negative aspiration for blood and anatomic landmarks palpated    Block outcome:  Anesthesia achieved  Procedure details:     Localization method:  Finder needle and probed    Dissection of underlying tissues: yes      Bloodless field: no      Removal mechanism:  Irrigation, scalpel and needle    Removal Method:  Open    Guidance: ultrasound      Foreign bodies recovered: FB identified and palpated - deroofed area - flushed - unable to find FB but no longer palpable.  Post-procedure details:     Skin closure:  None    Dressing:  Non-adherent dressing    Patient tolerance of procedure:  Tolerated well, no immediate complications           ED Course                               SBIRT 20yo+      Flowsheet Row Most Recent Value   Initial Alcohol Screen: US AUDIT-C     1. How often do you have a drink containing alcohol? 0 Filed at: 06/18/2024 2346   2. How many drinks containing alcohol do you have on a typical day you are drinking?  0 Filed at: 06/18/2024 2346   3a. Male UNDER 65: How often do you have five or more drinks on one occasion? 0 Filed at: 06/18/2024 2346   3b. FEMALE Any Age, or MALE 65+: How often do you have 4 or more drinks on one occassion? 0 Filed at: 06/18/2024 2247   Audit-C Score 0 Filed at: 06/18/2024 2346   FUNMILAYO: How many times in the past year have you...    Used an illegal drug or used a prescription medication for non-medical reasons? Never Filed at: 06/18/2024 2346   DAST-10: In the past 12 months...    1. Have you used drugs other than those required for medical reasons? 0 Filed at: 06/18/2024 2346   2. Do you use more than one drug at a time? 0 Filed at: 06/18/2024 2346   3. Have you had medical problems as a result of your drug use (e.g., memory loss, hepatitis, convulsions, bleeding, etc.)? 0 Filed at: 06/18/2024 2346   4. Have you had \"blackouts\" or \"flashbacks\" as a result of drug use?YesNo 0 Filed at: 06/18/2024 2346   5. " Do you ever feel bad or guilty about your drug use? 0 Filed at: 06/18/2024 2346   6. Does your spouse (or parent) ever complain about your involvement with drugs? 0 Filed at: 06/18/2024 2346   7. Have you neglected your family because of your use of drugs? 0 Filed at: 06/18/2024 2346   8. Have you engaged in illegal activities in order to obtain drugs? 0 Filed at: 06/18/2024 2346   9. Have you ever experienced withdrawal symptoms (felt sick) when you stopped taking drugs? 0 Filed at: 06/18/2024 2346   10. Are you always able to stop using drugs when you want to? 0 Filed at: 06/18/2024 2346   DAST-10 Score 0 Filed at: 06/18/2024 2346                      Medical Decision Making  Problems Addressed:  Foreign body (FB) in soft tissue: acute illness or injury    Amount and/or Complexity of Data Reviewed  Radiology: ordered and independent interpretation performed.    Risk  OTC drugs.  Prescription drug management.             Disposition  Final diagnoses:   Foreign body (FB) in soft tissue     Time reflects when diagnosis was documented in both MDM as applicable and the Disposition within this note       Time User Action Codes Description Comment    6/18/2024 11:55 PM Nusrat Spencer Add [M79.5] Foreign body (FB) in soft tissue           ED Disposition       ED Disposition   Discharge    Condition   Stable    Date/Time   Tue Jun 18, 2024 4147    Comment   Gurpreet Adames III discharge to home/self care.                   Follow-up Information       Follow up With Specialties Details Why Contact Info Additional Information    [facility] Go to  As needed [address] [address]  [phone]        [facility] Emergency [facility] Go to  If symptoms worsen [address] [facility] [address] [phone] [facility], [address] [place], [place] [address]     [facility] Call  As needed [address]  [facility] [address] [phone] [facility] [place], [address], [address] [place], [place], [address] [phone]            Patient's Medications   Discharge Prescriptions    CEPHALEXIN (KEFLEX) 500 MG CAPSULE    Take 1 capsule (500 mg total) by mouth 3 (three) times a day for 7 days       Start Date: 6/18/2024 End Date: 6/25/2024       Order Dose: 500 mg       Quantity: 21 capsule    Refills: 0    CHLORHEXIDINE GLUCONATE (HIBICLENS) 4 % EXTERNAL LIQUID    Apply 1 Application topically daily as needed for wound care for up to 7 days       Start Date: 6/18/2024 End Date: 6/25/2024       Order Dose: 1 Application       Quantity: 236 mL    Refills: 0       No discharge procedures on file.    PDMP Review         Value Time User    PDMP Reviewed  Yes 3/30/2023 12:28 PM JESSICA Rodriguez            ED Provider  Electronically Signed by             Nusrat Spencer PA-C  06/18/24 5226

## 2024-07-18 ENCOUNTER — TELEMEDICINE (OUTPATIENT)
Dept: BEHAVIORAL/MENTAL HEALTH CLINIC | Facility: CLINIC | Age: 34
End: 2024-07-18
Payer: COMMERCIAL

## 2024-07-18 DIAGNOSIS — F41.1 GAD (GENERALIZED ANXIETY DISORDER): ICD-10-CM

## 2024-07-18 DIAGNOSIS — F33.1 MODERATE RECURRENT MAJOR DEPRESSION (HCC): Primary | ICD-10-CM

## 2024-07-18 DIAGNOSIS — F41.0 PANIC DISORDER: ICD-10-CM

## 2024-07-18 PROCEDURE — 90837 PSYTX W PT 60 MINUTES: CPT

## 2024-07-18 NOTE — PSYCH
"Behavioral Health Psychotherapy Progress Note    Psychotherapy Provided: Individual Psychotherapy     1. Moderate recurrent major depression (HCC)        2. TONIE (generalized anxiety disorder)        3. Panic disorder          The patient was identified by name and date of birth. Patient was informed that this is a telemedicine visit and that the visit is being conducted throughthe Epic Embedded platform. Patient agrees to proceed..  My office door was closed. No one else was in the room.  Patient acknowledged consent and understanding of privacy and security of the video platform. The patient has agreed to participate and understands they can discontinue the visit at any time.     Patient is aware this is a billable service.     Goals addressed in session: Goal 1 and Goal 2     DATA:     Therapist provided introductions and opened space for client to ask questions.  Client provided introductions and an explanation of needs, thoughts, and goals.  Therapist engaged client in conversation surrounding scheduling.    During this session, this clinician used the following therapeutic modalities: Engagement Strategies, Client-centered Therapy, Mindfulness-based Strategies, and Supportive Psychotherapy    Substance Abuse was not addressed during this session. If the client is diagnosed with a co-occurring substance use disorder, please indicate any changes in the frequency or amount of use: n/a. Stage of change for addressing substance use diagnoses: No substance use/Not applicable    ASSESSMENT:  Gurpreet Adames III presents with a Euthymic/ normal mood.     his affect is Normal range and intensity, which is congruent, with his mood and the content of the session. The client has made progress on their goals.     Gurpreet Adames III presents with a low risk of suicide, low risk of self-harm, and low risk of harm to others.    For any risk assessment that surpasses a \"low\" rating, a safety plan must be developed.    A safety " plan was indicated: yes  If yes, describe in detail reviewed    PLAN: Between sessions, Gurpreet Adames III will practice mindful expression and communication and emotions and needs within the context of family. At the next session, the therapist will use Engagement Strategies, Cognitive Behavioral Therapy, Mindfulness-based Strategies, and Supportive Psychotherapy to address goals and needs.    Behavioral Health Treatment Plan and Discharge Planning: Gurpreet Adames III is aware of and agrees to continue to work on their treatment plan. They have identified and are working toward their discharge goals. yes    Visit start and stop times:    07/18/24

## 2024-07-22 NOTE — PSYCH
"Behavioral Health Psychotherapy Progress Note    Psychotherapy Provided: Individual Psychotherapy     1. Moderate recurrent major depression (HCC)        2. TONIE (generalized anxiety disorder)        3. Panic disorder          The patient was identified by name and date of birth. Patient was informed that this is a telemedicine visit and that the visit is being conducted throughthe Epic Embedded platform. Patient agrees to proceed..  My office door was closed. No one else was in the room.  Patient acknowledged consent and understanding of privacy and security of the video platform. The patient has agreed to participate and understands they can discontinue the visit at any time.     Patient is aware this is a billable service.     Goals addressed in session: Goal 1 and Goal 2     DATA:     Sent client signature pages for treatment plan and safety plan for endorsement.    Therapist sent invite link to client at 656am for 7am session.    During this session, this clinician used the following therapeutic modalities: Engagement Strategies, Client-centered Therapy, Cognitive Behavioral Therapy, Mindfulness-based Strategies, Motivational Interviewing, Solution-Focused Therapy, and Supportive Psychotherapy    Substance Abuse was not addressed during this session. If the client is diagnosed with a co-occurring substance use disorder, please indicate any changes in the frequency or amount of use: n/a. Stage of change for addressing substance use diagnoses: No substance use/Not applicable    ASSESSMENT:  Gurpreet Adames III presents with a Euthymic/ normal mood.     his affect is Normal range and intensity, which is congruent, with his mood and the content of the session. The client has made progress on their goals.     Gurpreet Adames III presents with a minimal risk of suicide, minimal risk of self-harm, and minimal risk of harm to others.    For any risk assessment that surpasses a \"low\" rating, a safety plan must be " developed.    A safety plan was indicated: no  If yes, describe in detail n/a    PLAN: Between sessions, Gurpreet Adames III will practice healthy communication of needs to support emotional regulation and executive functioning. At the next session, the therapist will use Engagement Strategies, Client-centered Therapy, Cognitive Behavioral Therapy, Mindfulness-based Strategies, Motivational Interviewing, Solution-Focused Therapy, and Supportive Psychotherapy to address anxiety.    Behavioral Health Treatment Plan and Discharge Planning: Gurpreet Adames III is aware of and agrees to continue to work on their treatment plan. They have identified and are working toward their discharge goals. yes    Visit start and stop times:    07/22/24

## 2024-07-23 ENCOUNTER — TELEMEDICINE (OUTPATIENT)
Dept: BEHAVIORAL/MENTAL HEALTH CLINIC | Facility: CLINIC | Age: 34
End: 2024-07-23

## 2024-07-23 DIAGNOSIS — F33.1 MODERATE RECURRENT MAJOR DEPRESSION (HCC): Primary | ICD-10-CM

## 2024-07-23 DIAGNOSIS — F41.1 GAD (GENERALIZED ANXIETY DISORDER): ICD-10-CM

## 2024-07-23 DIAGNOSIS — F41.0 PANIC DISORDER: ICD-10-CM

## 2024-07-24 ENCOUNTER — TELEPHONE (OUTPATIENT)
Dept: PSYCHIATRY | Facility: CLINIC | Age: 34
End: 2024-07-24

## 2024-07-30 ENCOUNTER — TELEMEDICINE (OUTPATIENT)
Dept: BEHAVIORAL/MENTAL HEALTH CLINIC | Facility: CLINIC | Age: 34
End: 2024-07-30
Payer: COMMERCIAL

## 2024-07-30 DIAGNOSIS — F33.1 MODERATE RECURRENT MAJOR DEPRESSION (HCC): ICD-10-CM

## 2024-07-30 DIAGNOSIS — F41.1 GAD (GENERALIZED ANXIETY DISORDER): Primary | ICD-10-CM

## 2024-07-30 PROCEDURE — 90837 PSYTX W PT 60 MINUTES: CPT

## 2024-07-30 NOTE — PSYCH
Behavioral Health Psychotherapy Progress Note    Psychotherapy Provided: Individual Psychotherapy     1. TONIE (generalized anxiety disorder)        2. Moderate recurrent major depression (HCC)            The patient was identified by name and date of birth. Patient was informed that this is a telemedicine visit and that the visit is being conducted throughthe Epic Embedded platform. Patient agrees to proceed..  My office door was closed. No one else was in the room.  Patient acknowledged consent and understanding of privacy and security of the video platform. The patient has agreed to participate and understands they can discontinue the visit at any time.     Patient is aware this is a billable service.     Goals addressed in session: Goal 1 and Goal 2     DATA:     Discussed schedule and strategies for organizing calendars for clarity and understanding.  Shared about purchasing a gym membership to support health and wellness.  Client shared about anxiety and agoraphobia.  Engaged in conversation surrounding internal coping and ability to emotionally regulate.  Engaged in conversation surrounding relationship with wife.  Shared about alcohol use, past infidelity, and impact on relationship.  Discussed triggers.  Discussed separation versus remaining in marriage.  Provided history.  Discussed creating timeline in 10 year chunks of time.  Client will begin assignment to discuss in sessions.    During this session, this clinician used the following therapeutic modalities: Engagement Strategies, Client-centered Therapy, Mindfulness-based Strategies, and Supportive Psychotherapy    Substance Abuse was not addressed during this session. If the client is diagnosed with a co-occurring substance use disorder, please indicate any changes in the frequency or amount of use: n/a. Stage of change for addressing substance use diagnoses: No substance use/Not applicable    ASSESSMENT:  Gurpreet Adames III presents with a Euthymic/  "normal mood.     his affect is Normal range and intensity, which is congruent, with his mood and the content of the session. The client has made progress on their goals.     Gurpreet Adames III presents with a minimal risk of suicide, minimal risk of self-harm, and minimal risk of harm to others.    For any risk assessment that surpasses a \"low\" rating, a safety plan must be developed.    A safety plan was indicated: yes  If yes, describe in detail reviewed for accuracy    PLAN: Between sessions, Gurpreet Adames III will practice mindful reflection to promote emotional regulation. At the next session, the therapist will use Engagement Strategies, Client-centered Therapy, Mindfulness-based Strategies, and Supportive Psychotherapy to address anxiety, emotional regulation, and executive functioning.    Behavioral Health Treatment Plan and Discharge Planning: Gurpreet Adames III is aware of and agrees to continue to work on their treatment plan. They have identified and are working toward their discharge goals. yes    Visit start and stop times:    07/30/24  Start Time: 0700  Stop Time: 0757  Total Visit Time: 57 minutes  "

## 2024-08-02 ENCOUNTER — TELEMEDICINE (OUTPATIENT)
Dept: PSYCHIATRY | Facility: CLINIC | Age: 34
End: 2024-08-02
Payer: COMMERCIAL

## 2024-08-02 ENCOUNTER — TELEPHONE (OUTPATIENT)
Dept: PSYCHIATRY | Facility: CLINIC | Age: 34
End: 2024-08-02

## 2024-08-02 DIAGNOSIS — F33.1 MAJOR DEPRESSIVE DISORDER, RECURRENT, MODERATE (HCC): ICD-10-CM

## 2024-08-02 DIAGNOSIS — F41.1 GAD (GENERALIZED ANXIETY DISORDER): ICD-10-CM

## 2024-08-02 PROCEDURE — 99214 OFFICE O/P EST MOD 30 MIN: CPT | Performed by: STUDENT IN AN ORGANIZED HEALTH CARE EDUCATION/TRAINING PROGRAM

## 2024-08-02 RX ORDER — SERTRALINE HYDROCHLORIDE 100 MG/1
200 TABLET, FILM COATED ORAL DAILY
Qty: 60 TABLET | Refills: 2 | Status: SHIPPED | OUTPATIENT
Start: 2024-08-02 | End: 2024-10-31

## 2024-08-02 RX ORDER — BUPROPION HYDROCHLORIDE 150 MG/1
300 TABLET ORAL DAILY
Qty: 60 TABLET | Refills: 2 | Status: SHIPPED | OUTPATIENT
Start: 2024-08-02 | End: 2024-10-31

## 2024-08-02 RX ORDER — CLONAZEPAM 1 MG/1
1 TABLET ORAL 2 TIMES DAILY
Qty: 60 TABLET | Refills: 2 | Status: SHIPPED | OUTPATIENT
Start: 2024-08-02 | End: 2024-10-31

## 2024-08-02 RX ORDER — BUSPIRONE HYDROCHLORIDE 30 MG/1
30 TABLET ORAL 2 TIMES DAILY
Qty: 60 TABLET | Refills: 2 | Status: SHIPPED | OUTPATIENT
Start: 2024-08-02 | End: 2024-10-31

## 2024-08-02 NOTE — PSYCH
MEDICATION MANAGEMENT NOTE        [facility]      Name and Date of Birth:  Gurpreet Adames III 33 y.o. 1990 MRN: [id number]    Date of Visit: August 2, 2024    Reason for Visit: Follow-up visit for medication management     Virtual Visit Disclaimer:       TeleMed provider: Irene Ambrose D.O.    Location: [place]     Verification of patient location:     Patient is currently located in the [facility]  Patient is currently located in a state in which I am licensed     After connecting through [employer], the patient was identified by name and date of birth.  Gurpreet Adames III was informed that this is a telemedicine visit that is being conducted through [employer], and the patient was informed that this is a secure, HIPAA-compliant platform. My office door was closed. No one else was in the room. Gurpreet Adames III acknowledged consent and understanding of privacy and security of the video platform. Gurpreet understands that the online visit is based solely on information provided by the patient, and that, in the absence of a face-to-face physical evaluation by the physician, the diagnosis Gurpreet  receives is both limited and provisional in terms of accuracy and completeness. Gurpreet Adames III understands that they can discontinue the visit at any time. I informed Gurpreet that I have reviewed their record in EPIC and presented the opportunity for them to ask any questions regarding the visit today. Gurpreet Adames III voiced understanding and consented to these terms. Gurpreet is aware this is a billable service. Gurpreet is present in [place]      SUBJECTIVE:    Gurpreet Adames III is a 33 y.o. male with past psychiatric history significant for  who was personally seen and evaluated today at the [facility] outpatient clinic for follow-up and medication management. Gurpreet denies SI, HI, AVH, delusions, paddy at this time or since our last appointment.   Patient continues to note some dysphoria but overall feels improved since her last appointment.  He continues to struggle with smoking cessation.  After discussion of risks, benefits, potential side effects, alternatives, we will increase Wellbutrin to 300 mg daily to alleviate continued dysphoria and help patient quit smoking.  He denies acute mental complaints or concerns at this time.      Current Rating Scores:     None completed today.    Review Of Systems:      Constitutional Negative   ENT Negative   Cardiovascular negative   Respiratory Negative   Gastrointestinal negative   Genitourinary negative   Musculoskeletal Negative   Integumentary negative   Neurological negative   Endocrine negative   Other Symptoms none, all other systems are negative       Past Psychiatric History: (unchanged information from previous note copied and italicized) - Information that is bolded has been updated.     See intake    Substance Abuse History: (unchanged information from previous note copied and italicized) - Information that is bolded has been updated.     See intake    Social History: (unchanged information from previous note copied and italicized) - Information that is bolded has been updated.     See intake    Traumatic History: (unchanged information from previous note copied and italicized) - Information that is bolded has been updated.     See intake      Past Medical History:    Past Medical History:   Diagnosis Date    Fatty liver     Sleep apnea         No past surgical history on file.  Allergies   Allergen Reactions    Medical Tape Rash     Welts, from ekg patches       Substance Abuse History:    Social History     Substance and Sexual Activity   Alcohol Use Yes    Comment: social     Social History     Substance and Sexual Activity   Drug Use No       Social History:    Social History     Socioeconomic History    Marital status: Legally      Spouse name: Not on file    Number of children: Not on file     Years of education: Not on file    Highest education level: Not on file   Occupational History    Not on file   Tobacco Use    Smoking status: Every Day     Types: Cigarettes    Smokeless tobacco: Never   Substance and Sexual Activity    Alcohol use: Yes     Comment: social    Drug use: No    Sexual activity: Yes     Partners: Female   Other Topics Concern    Not on file   Social History Narrative    Not on file     Social Determinants of Health     Financial Resource Strain: High Risk (3/11/2024)    [facility]    Overall Financial Resource Strain (CARDIA)     Difficulty of Paying Living Expenses: Very hard   Food Insecurity: Food Insecurity Present (3/11/2024)    [facility]    Hunger Vital Sign     Worried About Running Out of Food in the Last Year: Sometimes true     Ran Out of Food in the Last Year: Often true   Transportation Needs: No Transportation Needs (3/11/2024)    [facility]    PRAPARE - Transportation     Lack of Transportation (Medical): No     Lack of Transportation (Non-Medical): No   Physical Activity: Insufficiently Active (2/5/2023)    Received from [facility]    Exercise Vital Sign     Days of Exercise per Week: 3 days     Minutes of Exercise per Session: 10 min   Stress: Stress Concern Present (3/11/2024)    [facility]    [employer] - Occupational Stress Questionnaire     Feeling of Stress : Rather much   Social Connections: Feeling Somewhat Isolated (3/11/2024)    [facility]    OASIS : Social Isolation     How often do you feel lonely or isolated from those around you?: Sometimes   Intimate Partner Violence: Not At Risk (3/11/2024)     "[facility]    Humiliation, Afraid, Rape, and Kick questionnaire     Fear of Current or Ex-Partner: No     Emotionally Abused: No     Physically Abused: No     Sexually Abused: No   Housing Stability: High Risk (3/11/2024)    [facility]    Housing Stability Vital Sign     Unable to Pay for Housing in the Last Year: Yes     Number of Places Lived in the Last Year: 2     Unstable Housing in the Last Year: Yes       Family Psychiatric History:     No family history on file.    History Review: The following portions of the patient's history were reviewed and updated as appropriate: allergies, current medications, past family history, past medical history, past social history, past surgical history, and problem list.         OBJECTIVE:     Vital signs in last 24 hours:    There were no vitals filed for this visit.    Mental Status Evaluation:    Appearance age appropriate, casually dressed   Behavior cooperative, calm   Speech normal rate, normal volume, normal pitch   Mood \"Okay\"   Affect Constricted   Thought Processes organized, goal directed   Associations intact associations   Thought Content no overt delusions   Perceptual Disturbances: no auditory hallucinations, no visual hallucinations   Abnormal Thoughts  Risk Potential Suicidal ideation - None at present  Homicidal ideation - None at present  Potential for aggression - No   Orientation oriented to person, place, time/date, and situation   Memory recent and remote memory grossly intact   Consciousness alert and awake   Attention Span Concentration Span attention span and concentration are age appropriate   Intellect appears to be of average intelligence   Insight intact   Judgement intact   Muscle Strength and  Gait unable to assess today due to virtual visit   Motor activity unable to assess today due to virtual visit   Language no " difficulty naming common objects, no difficulty repeating a phrase   Fund of Knowledge adequate knowledge of current events  adequate fund of knowledge regarding past history  adequate fund of knowledge regarding vocabulary    Pain mild   Pain Scale Did not ask patient to formally rate       Laboratory Results: I have personally reviewed all pertinent laboratory/tests results    Recent Labs (last 2 months):   No visits with results within 2 Month(s) from this visit.   Latest known visit with results is:   Admission on 02/09/2024, Discharged on 02/09/2024   Component Date Value    Amph/Meth UR 02/09/2024 Negative     Barbiturate Ur 02/09/2024 Negative     Benzodiazepine Urine 02/09/2024 Positive (A)     Cocaine Urine 02/09/2024 Negative     Methadone Urine 02/09/2024 Negative     Opiate Urine 02/09/2024 Negative     PCP Ur 02/09/2024 Negative     THC Urine 02/09/2024 Negative     Oxycodone Urine 02/09/2024 Negative     EXTBreath Alcohol 02/09/2024 0.000        Suicide/Homicide Risk Assessment:    Risk of Harm to Self:  The following ratings are based on assessment at the time of the interview  Historical Risk Factors include: chronic psychiatric problems  Protective Factors: no current suicidal ideation, access to mental health treatment, compliant with medications, compliant with mental health treatment, connection to own children, having a desire to be alive    Risk of Harm to Others:  The following ratings are based on assessment at the time of the interview  Historical Risk Factors include: history of aggressive behavior.  Protective Factors: no current homicidal ideation, compliant with medications, compliant with mental health treatment, connection to own children    The following interventions are recommended: contracts for safety at present - agrees to go to ED if feeling unsafe, contracts for safety at present - agrees to call Crisis Intervention Service if feeling unsafe      Lethality Statement:    Based  on today's assessment and clinical criteria, Gurpreet Adames III contracts for safety and is not an imminent risk of harm to self or others. Outpatient level of care is deemed appropriate at this current time. Gurpreet understands that if they can no longer contract for safety, they need to call the office or report to their nearest Emergency Room for immediate evaluation. They voiced understanding and agreement to call 911 or head to the nearest ED should they have any physical or mental decompensation whatsoever.       Assessment/Plan:     1.)  MDD, recurrent, mild  2.)  Agoraphobia with panic  3.)  TONIE      After discussion of risks and benefits, the side effects, alternatives, we will increase Wellbutrin to 300 mg daily.  Rest of regimen remains the same.  Patient will continue to meet regularly with his therapist and denies acute mental complaints or concerns at this time      Medications Risks/Benefits      Risks, Benefits And Possible Side Effects Of Medications:    Risks, benefits, and possible side effects of medications explained to Gurpreet and he verbalizes understanding and agreement for treatment.    Controlled Medication Discussion:     Gurpreet has been filling controlled prescriptions on time as prescribed according to [place] Prescription Drug Monitoring Program    Psychotherapy Provided:     Individual psychotherapy provided: Crisis/safety plan discussed with Gurpreet.     Treatment Plan:    Completed and signed during the session: Not applicable - Treatment Plan to be completed by [facility] therapist      Visit Time    Visit Start Time: 10:30 AM  Visit Stop Time: 10:45 AM  Total Visit Duration:  15 minutes     The total visit duration detailed above includes: patient engagement, medication management, psychotherapy/counseling, discussion regarding treatment goals, documentation, review of past medical records, and coordination of care.      Note Share Disclaimer:     This note was  not shared with the patient due to reasonable likelihood of causing patient harm      Irene Ambrose DO  Psychiatry  08/02/24

## 2024-08-02 NOTE — TELEPHONE ENCOUNTER
Left voicemail informing patient and/or parent/guardian of the Psych Encounter form needing to be signed as a requirement from the insurance company for billing purposes. Patient can access form via [employer] and sign electronically.     Please make patient aware this form must be signed for each visit as a requirement to continue future visits with provider.    Encounter form 7/30

## 2024-08-06 ENCOUNTER — TELEMEDICINE (OUTPATIENT)
Dept: BEHAVIORAL/MENTAL HEALTH CLINIC | Facility: CLINIC | Age: 34
End: 2024-08-06
Payer: COMMERCIAL

## 2024-08-06 DIAGNOSIS — F41.0 PANIC DISORDER: ICD-10-CM

## 2024-08-06 DIAGNOSIS — F33.1 MODERATE RECURRENT MAJOR DEPRESSION (HCC): ICD-10-CM

## 2024-08-06 DIAGNOSIS — F41.1 GAD (GENERALIZED ANXIETY DISORDER): Primary | ICD-10-CM

## 2024-08-06 PROCEDURE — 90837 PSYTX W PT 60 MINUTES: CPT

## 2024-08-06 NOTE — PSYCH
Behavioral Health Psychotherapy Progress Note    Psychotherapy Provided: Individual Psychotherapy     1. TONIE (generalized anxiety disorder)        2. Moderate recurrent major depression (HCC)        3. Panic disorder          The patient was identified by name and date of birth. Patient was informed that this is a telemedicine visit and that the visit is being conducted throughthe Epic Embedded platform. Patient agrees to proceed..  My office door was closed. No one else was in the room.  Patient acknowledged consent and understanding of privacy and security of the video platform. The patient has agreed to participate and understands they can discontinue the visit at any time.     Patient is aware this is a billable service.   Clinician within the [address] [facility]    Goals addressed in session: Goal 1 and Goal 2    DATA:     Client and therapist engaged in conversation surrounding current stressors and ability to regulate.  Shared concerns about son and increased aggression against his sisters.  Therapist engaged in conversation surrounding impact on family.  Client shared generational history surrounding mental health.    During this session, this clinician used the following therapeutic modalities: Engagement Strategies, Client-centered Therapy, Cognitive Behavioral Therapy, Mindfulness-based Strategies, Motivational Interviewing, Solution-Focused Therapy, and Supportive Psychotherapy    Substance Abuse was not addressed during this session. If the client is diagnosed with a co-occurring substance use disorder, please indicate any changes in the frequency or amount of use: n/a. Stage of change for addressing substance use diagnoses: No substance use/Not applicable    ASSESSMENT:  Gurpreet Adames III presents with a Euthymic/ normal mood.     his affect is Normal range and intensity, which is congruent, with his mood and the content of the session. The client has made progress on their  "goals.     Gurpreet Adames III presents with a minimal risk of suicide, minimal risk of self-harm, and minimal risk of harm to others.    For any risk assessment that surpasses a \"low\" rating, a safety plan must be developed.    A safety plan was indicated: no  If yes, describe in detail n/a    PLAN: Between sessions, Gurpreet Adames III will practice identifying what he is experiencing in the moment to minimize anxiety. At the next session, the therapist will use Engagement Strategies, Client-centered Therapy, Cognitive Behavioral Therapy, Mindfulness-based Strategies, Motivational Interviewing, Solution-Focused Therapy, and Supportive Psychotherapy to address anxiety.    Behavioral Health Treatment Plan and Discharge Planning: Gurpreet Adames III is aware of and agrees to continue to work on their treatment plan. They have identified and are working toward their discharge goals. yes    Visit start and stop times:    08/06/24     "

## 2024-08-07 ENCOUNTER — TELEPHONE (OUTPATIENT)
Dept: PSYCHIATRY | Facility: CLINIC | Age: 34
End: 2024-08-07

## 2024-08-13 ENCOUNTER — TELEPHONE (OUTPATIENT)
Age: 34
End: 2024-08-13

## 2024-08-13 ENCOUNTER — TELEMEDICINE (OUTPATIENT)
Dept: BEHAVIORAL/MENTAL HEALTH CLINIC | Facility: CLINIC | Age: 34
End: 2024-08-13

## 2024-08-13 DIAGNOSIS — F41.1 GAD (GENERALIZED ANXIETY DISORDER): Primary | ICD-10-CM

## 2024-08-13 DIAGNOSIS — F33.1 MODERATE RECURRENT MAJOR DEPRESSION (HCC): ICD-10-CM

## 2024-08-13 DIAGNOSIS — F41.0 PANIC DISORDER: ICD-10-CM

## 2024-08-13 NOTE — TELEPHONE ENCOUNTER
Patient is calling regarding cancelling an appointment.    Date/Time: 8/13/2024 12pm    Reason:     Patient was rescheduled: YES [] NO [x]  If yes, when was Patient reschedule for:     Patient requesting call back to reschedule: YES [] NO [x]

## 2024-08-20 ENCOUNTER — TELEMEDICINE (OUTPATIENT)
Dept: BEHAVIORAL/MENTAL HEALTH CLINIC | Facility: CLINIC | Age: 34
End: 2024-08-20
Payer: COMMERCIAL

## 2024-08-20 DIAGNOSIS — F33.1 MAJOR DEPRESSIVE DISORDER, RECURRENT, MODERATE (HCC): ICD-10-CM

## 2024-08-20 DIAGNOSIS — F41.0 PANIC DISORDER: Primary | ICD-10-CM

## 2024-08-20 DIAGNOSIS — F41.1 GAD (GENERALIZED ANXIETY DISORDER): ICD-10-CM

## 2024-08-20 PROCEDURE — 90837 PSYTX W PT 60 MINUTES: CPT

## 2024-08-20 RX ORDER — BUPROPION HYDROCHLORIDE 150 MG/1
300 TABLET ORAL DAILY
Qty: 60 TABLET | Refills: 2 | Status: SHIPPED | OUTPATIENT
Start: 2024-08-20

## 2024-08-20 NOTE — PSYCH
Behavioral Health Psychotherapy Progress Note    Psychotherapy Provided: Individual Psychotherapy     1. Panic disorder        2. TONIE (generalized anxiety disorder)          The patient was identified by name and date of birth. Patient was informed that this is a telemedicine visit and that the visit is being conducted through the Epic Embedded platform. Patient agrees to proceed..  My office door was closed. No one else was in the room.  Patient acknowledged consent and understanding of privacy and security of the video platform. The patient has agreed to participate and understands they can discontinue the visit at any time.     Patient is aware this is a billable service.   Clinician was within [address] [facility]    Goals addressed in session: Goal 1 and Goal 2     DATA:     Therapist shared about leaving practice.  Engaged in conversation surrounding next steps.    Client shared about discord in relationship with wife.  Therapist utilized curious questioning to foster conversation surrounding family functioning  Discussed coping strategies and personal boundaries.    During this session, this clinician used the following therapeutic modalities: Engagement Strategies, Client-centered Therapy, Cognitive Behavioral Therapy, Mindfulness-based Strategies, Motivational Interviewing, Solution-Focused Therapy, and Supportive Psychotherapy    Substance Abuse was not addressed during this session. If the client is diagnosed with a co-occurring substance use disorder, please indicate any changes in the frequency or amount of use: n/a. Stage of change for addressing substance use diagnoses: No substance use/Not applicable    ASSESSMENT:  Gurpreet Adames III presents with a Euthymic/ normal mood.     his affect is Normal range and intensity, which is congruent, with his mood and the content of the session. The client has made progress on their goals.     Gurpreet Adames III presents with a minimal risk of  "suicide, minimal risk of self-harm, and minimal risk of harm to others.    For any risk assessment that surpasses a \"low\" rating, a safety plan must be developed.    A safety plan was indicated: no  If yes, describe in detail n/a    PLAN: Between sessions, Gurpreet Adames III will practice mindful reflection and communication of needs. At the next session, the therapist will use Engagement Strategies, Client-centered Therapy, Cognitive Behavioral Therapy, Mindfulness-based Strategies, Motivational Interviewing, Solution-Focused Therapy, and Supportive Psychotherapy to address emotional regulation.    Behavioral Health Treatment Plan and Discharge Planning: Gurpreet Adames III is aware of and agrees to continue to work on their treatment plan. They have identified and are working toward their discharge goals. yes    Visit start and stop times:    08/20/24  Start Time: 0700  Stop Time: 0754  Total Visit Time: 54 minutes  "

## 2024-08-23 ENCOUNTER — TELEPHONE (OUTPATIENT)
Dept: PSYCHIATRY | Facility: CLINIC | Age: 34
End: 2024-08-23

## 2024-08-23 NOTE — TELEPHONE ENCOUNTER
Left voicemail informing patient and/or parent/guardian of the Psych Encounter form needing to be signed as a requirement from the insurance company for billing purposes. Patient can access form via [employer] and sign electronically.     Please make patient aware this form must be signed for each visit as a requirement to continue future visits with provider.    Encounter form 8/20/24   · Creatinine continues to trend down, 10/17 2 32  · Nephrology follow-up appointment on 11/07

## 2024-08-26 ENCOUNTER — TELEPHONE (OUTPATIENT)
Dept: PSYCHIATRY | Facility: CLINIC | Age: 34
End: 2024-08-26

## 2024-08-26 NOTE — TELEPHONE ENCOUNTER
Called client to confirm upcoming appt. Client asked if he could move the appt to 8am instead of 7am so that wife can join appt after the children are put on the bus for school.    Appt is now scheduled for 8/27/24 @ 8am

## 2024-08-27 ENCOUNTER — TELEMEDICINE (OUTPATIENT)
Dept: BEHAVIORAL/MENTAL HEALTH CLINIC | Facility: CLINIC | Age: 34
End: 2024-08-27
Payer: COMMERCIAL

## 2024-08-27 DIAGNOSIS — F41.0 PANIC DISORDER: ICD-10-CM

## 2024-08-27 DIAGNOSIS — F41.1 GAD (GENERALIZED ANXIETY DISORDER): Primary | ICD-10-CM

## 2024-08-27 PROCEDURE — 90847 FAMILY PSYTX W/PT 50 MIN: CPT

## 2024-08-27 NOTE — PSYCH
Behavioral Health Psychotherapy Progress Note    Psychotherapy Provided: Family Therapy    1. TONIE (generalized anxiety disorder)        2. Panic disorder          The patient was identified by name and date of birth. Patient was informed that this is a telemedicine visit and that the visit is being conducted through the Epic Embedded platform. Patient agrees to proceed..  My office door was closed. No one else was in the room.  Patient acknowledged consent and understanding of privacy and security of the video platform. The patient has agreed to participate and understands they can discontinue the visit at any time.     Patient is aware this is a billable service.   Clinician was within [facility] [address] [facility]    Goals addressed in session: Goal 1 and Goal 2     DATA:     Gurpreet and Lizet both present in session.  Partners both shared about where they are currently as individuals and as a couple.  Shared about struggles with the children during separation.  Therapist to reach out to  for child therapists for the children.  Gurpreet prefers outpatient therapy rather than school-based therapy.  Will begin couples sessions.    During this session, this clinician used the following therapeutic modalities: Engagement Strategies, Family Therapy, and Supportive Psychotherapy    Substance Abuse was not addressed during this session. If the client is diagnosed with a co-occurring substance use disorder, please indicate any changes in the frequency or amount of use: n/a. Stage of change for addressing substance use diagnoses: No substance use/Not applicable    ASSESSMENT:  Gurpreet Adames III presents with a Euthymic/ normal mood.     his affect is Normal range and intensity, which is congruent, with his mood and the content of the session. The client has made progress on their goals.     Gurpreet Adames III presents with a minimal risk of suicide, minimal risk of self-harm, and minimal risk of harm to  "others.    For any risk assessment that surpasses a \"low\" rating, a safety plan must be developed.    A safety plan was indicated: no  If yes, describe in detail n/a    PLAN: Between sessions, Gurpreet Adames III will practice mindful and assertive communication of needs. At the next session, the therapist will use Engagement Strategies, Mindfulness-based Strategies, and Supportive Psychotherapy to address emotional regulation and executive functioning.    Behavioral Health Treatment Plan and Discharge Planning: Gurpreet Adames III is aware of and agrees to continue to work on their treatment plan. They have identified and are working toward their discharge goals. yes    Visit start and stop times:    08/27/24     "

## 2024-08-30 ENCOUNTER — TELEPHONE (OUTPATIENT)
Dept: PSYCHIATRY | Facility: CLINIC | Age: 34
End: 2024-08-30

## 2024-08-30 NOTE — TELEPHONE ENCOUNTER
Left voicemail informing patient and/or parent/guardian of the Psych Encounter form needing to be signed as a requirement from the insurance company for billing purposes. Patient can access form via [employer] and sign electronically.     Please make patient aware this form must be signed for each visit as a requirement to continue future visits with provider.    Virtual Encounter form 8/27/24

## 2024-09-03 ENCOUNTER — TELEMEDICINE (OUTPATIENT)
Dept: BEHAVIORAL/MENTAL HEALTH CLINIC | Facility: CLINIC | Age: 34
End: 2024-09-03
Payer: COMMERCIAL

## 2024-09-03 DIAGNOSIS — F33.1 MODERATE RECURRENT MAJOR DEPRESSION (HCC): ICD-10-CM

## 2024-09-03 DIAGNOSIS — F41.1 GAD (GENERALIZED ANXIETY DISORDER): Primary | ICD-10-CM

## 2024-09-03 DIAGNOSIS — F41.0 PANIC DISORDER: ICD-10-CM

## 2024-09-03 PROCEDURE — 90837 PSYTX W PT 60 MINUTES: CPT

## 2024-09-03 NOTE — PSYCH
"Behavioral Health Psychotherapy Progress Note    Psychotherapy Provided: Family Therapy    1. TONIE (generalized anxiety disorder)        2. Panic disorder        3. Moderate recurrent major depression (HCC)            Goals addressed in session: Couple session to establish needs     DATA:     Client shared about wife and situation that occurred in result of impulsive acts.  Client shared about relationships with children and wife.  Discussed decision-making within family and couple that impacts functioning.  Discussed transitions and feelings surrounding change.    During this session, this clinician used the following therapeutic modalities: Engagement Strategies, Client-centered Therapy, Cognitive Behavioral Therapy, Family Therapy, Mindfulness-based Strategies, Motivational Interviewing, Solution-Focused Therapy, and Supportive Psychotherapy    Substance Abuse was not addressed during this session. If the client is diagnosed with a co-occurring substance use disorder, please indicate any changes in the frequency or amount of use: n/a. Stage of change for addressing substance use diagnoses: No substance use/Not applicable    ASSESSMENT:  Gurpreet Adames III presents with a Euthymic/ normal mood.     his affect is Normal range and intensity, which is congruent, with his mood and the content of the session. The client has made progress on their goals.     Gurpreet Adames III presents with a minimal risk of suicide, minimal risk of self-harm, and minimal risk of harm to others.    For any risk assessment that surpasses a \"low\" rating, a safety plan must be developed.    A safety plan was indicated: no  If yes, describe in detail n/a    PLAN: Between sessions, Gurpreet Adames III will utilize therapy space as needed to address individual therapy and couple functional therapy. At the next session, the therapist will use Engagement Strategies, Client-centered Therapy, Cognitive Behavioral Therapy, Family Therapy, " Mindfulness-based Strategies, Motivational Interviewing, Solution-Focused Therapy, and Supportive Psychotherapy to address family functioning.    Behavioral Health Treatment Plan and Discharge Planning: Gurpreet Adames III is aware of and agrees to continue to work on their treatment plan. They have identified and are working toward their discharge goals. yes    Visit start and stop times:    09/03/24

## 2024-09-06 ENCOUNTER — DOCUMENTATION (OUTPATIENT)
Dept: BEHAVIORAL/MENTAL HEALTH CLINIC | Facility: CLINIC | Age: 34
End: 2024-09-06

## 2024-09-06 NOTE — PROGRESS NOTES
Psychotherapy Discharge Summary    Preferred Name: Gurpreet Adames III  YOB: 1990    Admission date to psychotherapy: 7/18/24    Referred by: self    Presenting Problem: anxiety, family issues    Course of treatment included : individual therapy     Progress/Outcome of Treatment Goals (brief summary of course of treatment) client participates to the best of his ability to support wellness and growth    Treatment Complications (if any): none    Treatment Progress: fair    Current [facility] Psychiatric Provider: Halie    Discharge Medications include: none    Discharge Date: 9/6/24    Discharge Diagnosis: Panic disorder, TONIE    Criteria for Discharge: clinician leaving practice    Aftercare recommendations include (include specific referral names and phone numbers, if appropriate): continue therapy    Prognosis: good

## 2024-09-09 ENCOUNTER — TELEPHONE (OUTPATIENT)
Dept: BEHAVIORAL/MENTAL HEALTH CLINIC | Facility: CLINIC | Age: 34
End: 2024-09-09

## 2024-09-11 ENCOUNTER — TELEPHONE (OUTPATIENT)
Dept: PSYCHIATRY | Facility: CLINIC | Age: 34
End: 2024-09-11

## 2024-09-17 ENCOUNTER — TELEPHONE (OUTPATIENT)
Dept: PSYCHIATRY | Facility: CLINIC | Age: 34
End: 2024-09-17

## 2024-09-17 NOTE — TELEPHONE ENCOUNTER
Left voicemail informing patient and/or parent/guardian of the Psych Encounter form needing to be signed as a requirement from the insurance company for billing purposes. Patient can access form via [employer] and sign electronically.     Please make patient aware this form must be signed for each visit as a requirement to continue future visits with provider.    Virtual Encounter form 9/3/24

## 2024-09-18 ENCOUNTER — TELEPHONE (OUTPATIENT)
Dept: PSYCHIATRY | Facility: CLINIC | Age: 34
End: 2024-09-18

## 2024-09-18 NOTE — TELEPHONE ENCOUNTER
Left voicemail informing patient and/or parent/guardian of the Psych Encounter form needing to be signed as a requirement from the insurance company for billing purposes. Patient can access form via [employer] and sign electronically.     Please make patient aware this form must be signed for each visit as a requirement to continue future visits with provider.

## 2024-09-24 ENCOUNTER — TELEPHONE (OUTPATIENT)
Dept: PSYCHIATRY | Facility: CLINIC | Age: 34
End: 2024-09-24

## 2024-09-24 NOTE — TELEPHONE ENCOUNTER
Unable to leave voicemail informing patient of the Psych Encounter form needing to be signed as a requirement from the insurance company for billing purposes. If patients contacts office, please make patient aware that the form can be accessed via [employer] to sign electronically and must be signed for each visit as a requirement to continue future visits with provider.

## 2024-10-13 ENCOUNTER — TELEPHONE (OUTPATIENT)
Dept: OTHER | Facility: OTHER | Age: 34
End: 2024-10-13

## 2024-10-13 NOTE — TELEPHONE ENCOUNTER
[facility] was responding to letter  Regarding scheduling an appointment; looking to schedule with Dr. Dumont    Please follow up with patient Monday.

## 2024-10-17 ENCOUNTER — TELEMEDICINE (OUTPATIENT)
Dept: PSYCHIATRY | Facility: CLINIC | Age: 34
End: 2024-10-17
Payer: COMMERCIAL

## 2024-10-17 DIAGNOSIS — F33.1 MAJOR DEPRESSIVE DISORDER, RECURRENT, MODERATE (HCC): Primary | ICD-10-CM

## 2024-10-17 DIAGNOSIS — F41.1 GAD (GENERALIZED ANXIETY DISORDER): ICD-10-CM

## 2024-10-17 PROCEDURE — 99214 OFFICE O/P EST MOD 30 MIN: CPT | Performed by: STUDENT IN AN ORGANIZED HEALTH CARE EDUCATION/TRAINING PROGRAM

## 2024-10-17 RX ORDER — BUPROPION HYDROCHLORIDE 300 MG/1
300 TABLET ORAL DAILY
Qty: 90 TABLET | Refills: 0 | Status: SHIPPED | OUTPATIENT
Start: 2024-10-17 | End: 2024-10-24 | Stop reason: SDUPTHER

## 2024-10-22 ENCOUNTER — TELEPHONE (OUTPATIENT)
Dept: PSYCHIATRY | Facility: CLINIC | Age: 34
End: 2024-10-22

## 2024-10-24 RX ORDER — SERTRALINE HYDROCHLORIDE 100 MG/1
200 TABLET, FILM COATED ORAL DAILY
Qty: 180 TABLET | Refills: 0 | Status: SHIPPED | OUTPATIENT
Start: 2024-10-24 | End: 2025-01-22

## 2024-10-24 RX ORDER — BUSPIRONE HYDROCHLORIDE 30 MG/1
30 TABLET ORAL 2 TIMES DAILY
Qty: 180 TABLET | Refills: 0 | Status: SHIPPED | OUTPATIENT
Start: 2024-10-24 | End: 2025-01-22

## 2024-10-24 RX ORDER — CLONAZEPAM 1 MG/1
1 TABLET ORAL 2 TIMES DAILY
Qty: 60 TABLET | Refills: 2 | Status: SHIPPED | OUTPATIENT
Start: 2024-10-24 | End: 2025-01-22

## 2024-10-24 RX ORDER — BUPROPION HYDROCHLORIDE 300 MG/1
300 TABLET ORAL DAILY
Qty: 90 TABLET | Refills: 0 | Status: SHIPPED | OUTPATIENT
Start: 2024-10-24 | End: 2025-01-22

## 2024-10-24 NOTE — PSYCH
MEDICATION MANAGEMENT NOTE        [facility]      Name and Date of Birth:  Gurpreet Adames III 34 y.o. 1990 MRN: [id number]    Date of Visit: October 24, 2024    Reason for Visit: Follow-up visit for medication management     Virtual Visit Disclaimer:       TeleMed provider: Irene Ambrose D.O.    Location: [place]     Verification of patient location:     Patient is currently located in the [facility]  Patient is currently located in a state in which I am licensed     After connecting through [employer], the patient was identified by name and date of birth.  Gurpreet Adames III was informed that this is a telemedicine visit that is being conducted through [employer], and the patient was informed that this is a secure, HIPAA-compliant platform. My office door was closed. No one else was in the room. Gurpreet Adames III acknowledged consent and understanding of privacy and security of the video platform. Gurpreet understands that the online visit is based solely on information provided by the patient, and that, in the absence of a face-to-face physical evaluation by the physician, the diagnosis Gurpreet  receives is both limited and provisional in terms of accuracy and completeness. Gurpreet Adames III understands that they can discontinue the visit at any time. I informed Gurpreet that I have reviewed their record in EPIC and presented the opportunity for them to ask any questions regarding the visit today. Gurpreet Adames III voiced understanding and consented to these terms. Gurpreet is aware this is a billable service. Gurpreet is present in [place]      SUBJECTIVE:    Gurpreet Adames III is a 34 y.o. male with past psychiatric history significant for  who was personally seen and evaluated today at the [facility] outpatient clinic for follow-up and medication management. Gurpreet denies SI, HI, AVH, delusions, paddy at this time or since our last appointment.   Patient noted improvement after recent increase in Wellbutrin and after discussion of risks, benefits, potential side effects, alternatives, he would like to continue on current regimen as is without any changes due to its effectiveness.  He denies acute mental complaints concerns at this time      None completed today.    Review Of Systems:      Constitutional Negative   ENT Negative   Cardiovascular negative   Respiratory Negative   Gastrointestinal negative   Genitourinary negative   Musculoskeletal Negative   Integumentary negative   Neurological negative   Endocrine negative   Other Symptoms none, all other systems are negative       Past Psychiatric History: (unchanged information from previous note copied and italicized) - Information that is bolded has been updated.     See intake    Substance Abuse History: (unchanged information from previous note copied and italicized) - Information that is bolded has been updated.     See intake    Social History: (unchanged information from previous note copied and italicized) - Information that is bolded has been updated.     See intake    Traumatic History: (unchanged information from previous note copied and italicized) - Information that is bolded has been updated.     See intake      Past Medical History:    Past Medical History:   Diagnosis Date    Fatty liver     Sleep apnea         No past surgical history on file.  Allergies   Allergen Reactions    Medical Tape Rash     Welts, from ekg patches       Substance Abuse History:    Social History     Substance and Sexual Activity   Alcohol Use Yes    Comment: social     Social History     Substance and Sexual Activity   Drug Use No       Social History:    Social History     Socioeconomic History    Marital status: Legally      Spouse name: Not on file    Number of children: Not on file    Years of education: Not on file    Highest education level: Not on file   Occupational History    Not on file   Tobacco  Use    Smoking status: Every Day     Types: Cigarettes    Smokeless tobacco: Never   Substance and Sexual Activity    Alcohol use: Yes     Comment: social    Drug use: No    Sexual activity: Yes     Partners: Female   Other Topics Concern    Not on file   Social History Narrative    Not on file     Social Determinants of Health     Financial Resource Strain: High Risk (3/11/2024)    [facility]    Overall Financial Resource Strain (CARDIA)     Difficulty of Paying Living Expenses: Very hard   Food Insecurity: Food Insecurity Present (3/11/2024)    [facility] from [facility], [facility]    Hunger Vital Sign     Worried About Running Out of Food in the Last Year: Sometimes true     Ran Out of Food in the Last Year: Often true   Transportation Needs: No Transportation Needs (3/11/2024)    Received from [facility], [facility]    PRAPARE - Transportation     Lack of Transportation (Medical): No     Lack of Transportation (Non-Medical): No   Physical Activity: Insufficiently Active (2/5/2023)    Received from [facility]    Exercise Vital Sign     Days of Exercise per Week: 3 days     Minutes of Exercise per Session: 10 min   Stress: Stress Concern Present (3/11/2024)    Received from [facility], [facility]    [employer] [place] [employer] - Occupational Stress Questionnaire     Feeling of Stress : Rather much   Social Connections: Feeling Somewhat Isolated (3/11/2024)    Received from [facility]    OASIS : Social Isolation     How often do you feel lonely or isolated from those around you?: Sometimes   Intimate Partner Violence: Not At Risk (3/11/2024)    Received from [facility], [facility]    Humiliation, Afraid, Rape, and Kick  "questionnaire     Fear of Current or Ex-Partner: No     Emotionally Abused: No     Physically Abused: No     Sexually Abused: No   Housing Stability: High Risk (3/11/2024)    [facility]    Housing Stability Vital Sign     Unable to Pay for Housing in the Last Year: Yes     Number of Places Lived in the Last Year: 2     Unstable Housing in the Last Year: Yes       Family Psychiatric History:     No family history on file.    History Review: The following portions of the patient's history were reviewed and updated as appropriate: allergies, current medications, past family history, past medical history, past social history, past surgical history, and problem list.         OBJECTIVE:     Vital signs in last 24 hours:    There were no vitals filed for this visit.    Mental Status Evaluation:    Appearance age appropriate, casually dressed   Behavior cooperative, calm   Speech normal rate, normal volume, normal pitch   Mood \"Better\"   Affect Constricted   Thought Processes organized, goal directed   Associations intact associations   Thought Content no overt delusions   Perceptual Disturbances: no auditory hallucinations, no visual hallucinations   Abnormal Thoughts  Risk Potential Suicidal ideation - None at present  Homicidal ideation - None at present  Potential for aggression - No   Orientation oriented to person, place, time/date, and situation   Memory recent and remote memory grossly intact   Consciousness alert and awake   Attention Span Concentration Span attention span and concentration are age appropriate   Intellect appears to be of average intelligence   Insight intact   Judgement intact   Muscle Strength and  Gait unable to assess today due to virtual visit   Motor activity unable to assess today due to virtual visit   Language no difficulty naming common objects, no difficulty repeating a phrase   Fund of Knowledge adequate knowledge of current " events  adequate fund of knowledge regarding past history  adequate fund of knowledge regarding vocabulary    Pain mild   Pain Scale Did not ask patient to formally rate       Laboratory Results: I have personally reviewed all pertinent laboratory/tests results    Recent Labs (last 2 months):   No visits with results within 2 Month(s) from this visit.   Latest known visit with results is:   Admission on 02/09/2024, Discharged on 02/09/2024   Component Date Value    Amph/Meth UR 02/09/2024 Negative     Barbiturate Ur 02/09/2024 Negative     Benzodiazepine Urine 02/09/2024 Positive (A)     Cocaine Urine 02/09/2024 Negative     Methadone Urine 02/09/2024 Negative     Opiate Urine 02/09/2024 Negative     PCP Ur 02/09/2024 Negative     THC Urine 02/09/2024 Negative     Oxycodone Urine 02/09/2024 Negative     EXTBreath Alcohol 02/09/2024 0.000        Suicide/Homicide Risk Assessment:    Risk of Harm to Self:  The following ratings are based on assessment at the time of the interview  Historical Risk Factors include: chronic psychiatric problems  Protective Factors: no current suicidal ideation, access to mental health treatment, compliant with medications, compliant with mental health treatment, connection to own children, having a desire to be alive    Risk of Harm to Others:  The following ratings are based on assessment at the time of the interview  Historical Risk Factors include: history of aggressive behavior.  Protective Factors: no current homicidal ideation, compliant with medications, compliant with mental health treatment, connection to own children    The following interventions are recommended: contracts for safety at present - agrees to go to ED if feeling unsafe, contracts for safety at present - agrees to call Crisis Intervention Service if feeling unsafe      Lethality Statement:    Based on today's assessment and clinical criteria, Gurpreet Adames III contracts for safety and is not an imminent risk of  harm to self or others. Outpatient level of care is deemed appropriate at this current time. Gurpreet understands that if they can no longer contract for safety, they need to call the office or report to their nearest Emergency Room for immediate evaluation. They voiced understanding and agreement to call 911 or head to the nearest ED should they have any physical or mental decompensation whatsoever.       Assessment/Plan:     1.)  MDD, recurrent, mild  2.)  Agoraphobia with panic  3.)  TONIE      After discussion of risks and benefits, the side effects, alternatives, we will continue Wellbutrin  mg every morning, BuSpar 30 mg twice daily, Klonopin 1 mg twice daily, Zoloft 200 mg daily.      Medications Risks/Benefits      Risks, Benefits And Possible Side Effects Of Medications:    Risks, benefits, and possible side effects of medications explained to Gurpreet and he verbalizes understanding and agreement for treatment.    Controlled Medication Discussion:     Gurpreet has been filling controlled prescriptions on time as prescribed according to [place] Prescription Drug Monitoring Program    Psychotherapy Provided:     Individual psychotherapy provided: Crisis/safety plan discussed with Gurpreet.     Treatment Plan:    Completed and signed during the session: Not applicable - Treatment Plan to be completed by [facility] therapist      Visit Time    Visit Start Time: 9:30 AM  Visit Stop Time: 9:45 AM  Total Visit Duration:  15 minutes     The total visit duration detailed above includes: patient engagement, medication management, psychotherapy/counseling, discussion regarding treatment goals, documentation, review of past medical records, and coordination of care.      Note Share Disclaimer:     This note was not shared with the patient due to reasonable likelihood of causing patient harm      Irene Ambrose DO  Psychiatry  10/24/24

## 2024-11-01 ENCOUNTER — TELEPHONE (OUTPATIENT)
Dept: PSYCHIATRY | Facility: CLINIC | Age: 34
End: 2024-11-01

## 2024-11-01 NOTE — TELEPHONE ENCOUNTER
Left voicemail informing patient and/or parent/guardian of the Psych Encounter form needing to be signed as a requirement from the insurance company for billing purposes. Patient can access form via [employer] and sign electronically.     Please make patient aware this form must be signed for each visit as a requirement to continue future visits with provider.

## 2024-11-05 ENCOUNTER — HOSPITAL ENCOUNTER (EMERGENCY)
Facility: HOSPITAL | Age: 34
Discharge: HOME | End: 2024-11-05
Attending: EMERGENCY MEDICINE
Payer: COMMERCIAL

## 2024-11-05 VITALS
WEIGHT: 315 LBS | RESPIRATION RATE: 18 BRPM | HEART RATE: 84 BPM | BODY MASS INDEX: 53.9 KG/M2 | DIASTOLIC BLOOD PRESSURE: 86 MMHG | OXYGEN SATURATION: 99 % | TEMPERATURE: 98.4 F | SYSTOLIC BLOOD PRESSURE: 190 MMHG

## 2024-11-05 DIAGNOSIS — R45.850 HOMICIDAL IDEATIONS: ICD-10-CM

## 2024-11-05 DIAGNOSIS — F32.A DEPRESSION: ICD-10-CM

## 2024-11-05 DIAGNOSIS — F41.9 ANXIETY: Primary | ICD-10-CM

## 2024-11-05 LAB
AMPHETAMINES SERPL QL SCN: NEGATIVE
BARBITURATES UR QL: NEGATIVE
BENZODIAZ UR QL: POSITIVE
COCAINE UR QL: NEGATIVE
ETHANOL EXG-MCNC: 0 MG/DL
FENTANYL UR QL SCN: NEGATIVE
HYDROCODONE UR QL SCN: NEGATIVE
METHADONE UR QL: NEGATIVE
OPIATES UR QL SCN: NEGATIVE
OXYCODONE+OXYMORPHONE UR QL SCN: NEGATIVE
PCP UR QL: NEGATIVE
THC UR QL: NEGATIVE

## 2024-11-05 PROCEDURE — 99285 EMERGENCY DEPT VISIT HI MDM: CPT | Performed by: EMERGENCY MEDICINE

## 2024-11-05 PROCEDURE — 80307 DRUG TEST PRSMV CHEM ANLYZR: CPT

## 2024-11-05 PROCEDURE — 99245 OFF/OP CONSLTJ NEW/EST HI 55: CPT | Performed by: STUDENT IN AN ORGANIZED HEALTH CARE EDUCATION/TRAINING PROGRAM

## 2024-11-05 PROCEDURE — 82075 ASSAY OF BREATH ETHANOL: CPT

## 2024-11-05 PROCEDURE — 99284 EMERGENCY DEPT VISIT MOD MDM: CPT

## 2024-11-05 RX ORDER — CLONAZEPAM 0.5 MG/1
1 TABLET ORAL 2 TIMES DAILY
Status: DISCONTINUED | OUTPATIENT
Start: 2024-11-05 | End: 2024-11-05 | Stop reason: HOSPADM

## 2024-11-05 RX ORDER — FAMOTIDINE 20 MG/1
20 TABLET, FILM COATED ORAL
Status: DISCONTINUED | OUTPATIENT
Start: 2024-11-05 | End: 2024-11-05 | Stop reason: HOSPADM

## 2024-11-05 RX ORDER — SERTRALINE HYDROCHLORIDE 100 MG/1
200 TABLET, FILM COATED ORAL DAILY
Status: DISCONTINUED | OUTPATIENT
Start: 2024-11-05 | End: 2024-11-05 | Stop reason: HOSPADM

## 2024-11-05 RX ORDER — BUSPIRONE HYDROCHLORIDE 15 MG/1
30 TABLET ORAL 2 TIMES DAILY
Status: DISCONTINUED | OUTPATIENT
Start: 2024-11-05 | End: 2024-11-05 | Stop reason: HOSPADM

## 2024-11-05 RX ORDER — BUPROPION HYDROCHLORIDE 300 MG/1
300 TABLET ORAL DAILY
Status: DISCONTINUED | OUTPATIENT
Start: 2024-11-05 | End: 2024-11-05 | Stop reason: HOSPADM

## 2024-11-05 RX ADMIN — SERTRALINE 200 MG: 100 TABLET, FILM COATED ORAL at 14:24

## 2024-11-05 RX ADMIN — BUPROPION HYDROCHLORIDE 300 MG: 300 TABLET, FILM COATED, EXTENDED RELEASE ORAL at 14:24

## 2024-11-05 NOTE — ED PROVIDER NOTES
Time reflects when diagnosis was documented in both MDM as applicable and the Disposition within this note       Time User Action Codes Description Comment    11/5/2024  2:04 PM Cody Olson [F41.9] Anxiety     11/5/2024  2:04 PM Cody Olson [F32.1] Current moderate episode of major depressive disorder, unspecified whether recurrent (HCC)     11/5/2024  2:04 PM Cody Olson Remove [F32.1] Current moderate episode of major depressive disorder, unspecified whether recurrent (HCC)     11/5/2024  2:05 PM Cody Olson Add [F32.A] Depression     11/5/2024  2:12 PM Cody Olson [R45.850] Homicidal ideations           ED Disposition       ED Disposition   Discharge    Condition   Stable    Date/Time   Tue Nov 5, 2024  4:17 PM    Comment   Cleared for discharge by psychiatrist             Assessment & Plan       Medical Decision Making  Here for crisis evaluation for reported homicidal threats and text and PFA breakage    Amount and/or Complexity of Data Reviewed  Labs: ordered.    Risk  Prescription drug management.  Decision regarding hospitalization.        ED Course as of 11/08/24 0729   Tue Nov 05, 2024   1616 Patient was evaluated by psychiatrist Dr. Moseley who does not believe patient needs inpatient admission either 201 or 302 and he is okay for discharge on current medication and follow-up with his mental health team       Medications - No data to display    ED Risk Strat Scores                           SBIRT 20yo+      Flowsheet Row Most Recent Value   Initial Alcohol Screen: US AUDIT-C     1. How often do you have a drink containing alcohol? 0 Filed at: 11/05/2024 1246   2. How many drinks containing alcohol do you have on a typical day you are drinking?  0 Filed at: 11/05/2024 1246   3a. Male UNDER 65: How often do you have five or more drinks on one occasion? 0 Filed at: 11/05/2024 1246   3b. FEMALE Any Age, or MALE 65+: How often do you have 4 or more drinks on one occassion? 0 Filed at:  11/05/2024 1246   Audit-C Score 0 Filed at: 11/05/2024 1246   FUNMILAYO: How many times in the past year have you...    Used an illegal drug or used a prescription medication for non-medical reasons? Never Filed at: 11/05/2024 1246                            History of Present Illness       Chief Complaint   Patient presents with    Psychiatric Evaluation     Comes to ed for a psych eval. Patient found out ex wife petitioned for a 302 against patient. Denies SI/HI and depression.       Past Medical History:   Diagnosis Date    Fatty liver     Sleep apnea       No past surgical history on file.   No family history on file.   Social History     Tobacco Use    Smoking status: Every Day     Types: Cigarettes    Smokeless tobacco: Never   Substance Use Topics    Alcohol use: Yes     Comment: social    Drug use: No      E-Cigarette/Vaping      E-Cigarette/Vaping Substances      I have reviewed and agree with the history as documented.     This is a 34-year-old male with a history of anxiety and depression presents for crisis evaluation.  302 was filed and upheld in [facility] for patient breaking a PFA order against his wife and threatening to kill her.  Patient currently denying suicidal or homicidal ideation      History provided by:  Patient  Medical Problem  Location:  Generalized  Quality:  Anxiety depression and reported homicidal threats  Severity:  Unable to specify  Onset quality:  Unable to specify  Timing:  Unable to specify  Context:  Anxiety depression with reported homicidal threats      Review of Systems   Psychiatric/Behavioral:  Negative for suicidal ideas.         Reported homicidal threats   All other systems reviewed and are negative.          Objective       ED Triage Vitals [11/05/24 1233]   Temperature Pulse Blood Pressure Respirations SpO2 Patient Position - Orthostatic VS   98.4 °F (36.9 °C) 84 (!) 190/86 18 99 % Sitting      Temp Source Heart Rate Source BP Location FiO2 (%) Pain Score    Oral  Monitor Right arm -- No Pain      Vitals      Date and Time Temp Pulse SpO2 Resp BP Pain Score FACES Pain Rating User   11/05/24 1250 -- -- -- -- -- No Pain -- EW   11/05/24 1233 98.4 °F (36.9 °C) 84 99 % 18 190/86 No Pain -- BY            Physical Exam  Vitals and nursing note reviewed.   Constitutional:       General: He is not in acute distress.     Appearance: He is not ill-appearing, toxic-appearing or diaphoretic.   HENT:      Head: Normocephalic and atraumatic.      Right Ear: External ear normal.      Left Ear: External ear normal.   Eyes:      Extraocular Movements: Extraocular movements intact.      Pupils: Pupils are equal, round, and reactive to light.   Cardiovascular:      Rate and Rhythm: Normal rate and regular rhythm.      Pulses: Normal pulses.      Heart sounds: No murmur heard.     No friction rub. No gallop.   Pulmonary:      Effort: No respiratory distress.      Breath sounds: No stridor. No wheezing, rhonchi or rales.   Abdominal:      General: There is no distension.      Palpations: Abdomen is soft.      Tenderness: There is no abdominal tenderness. There is no guarding or rebound.   Musculoskeletal:         General: No swelling, tenderness, deformity or signs of injury.      Cervical back: Neck supple. No rigidity.      Right lower leg: No edema.      Left lower leg: No edema.   Skin:     General: Skin is warm and dry.      Coloration: Skin is not jaundiced.      Findings: No bruising.   Neurological:      General: No focal deficit present.      Mental Status: He is alert and oriented to person, place, and time.      Cranial Nerves: No cranial nerve deficit.      Sensory: No sensory deficit.      Motor: No weakness.      Coordination: Coordination normal.   Psychiatric:         Mood and Affect: Mood normal.         Behavior: Behavior normal.         Results Reviewed       Procedure Component Value Units Date/Time    Rapid drug screen, urine [[id number]]  (Abnormal) Collected: 11/05/24 1343     Lab Status: Final result Specimen: Urine, Clean Catch Updated: 11/05/24 1404     Amph/Meth UR Negative     Barbiturate Ur Negative     Benzodiazepine Urine Positive     Cocaine Urine Negative     Methadone Urine Negative     Opiate Urine Negative     PCP Ur Negative     THC Urine Negative     Oxycodone Urine Negative     Fentanyl Urine Negative     HYDROCODONE URINE Negative    Narrative:      Presumptive report. If requested, specimen will be sent to reference lab for confirmation.  FOR MEDICAL PURPOSES ONLY.   IF CONFIRMATION NEEDED PLEASE CONTACT THE LAB WITHIN 5 DAYS.    Drug Screen Cutoff Levels:  AMPHETAMINE/METHAMPHETAMINES  1000 ng/mL  BARBITURATES     200 ng/mL  BENZODIAZEPINES     200 ng/mL  COCAINE      300 ng/mL  METHADONE      300 ng/mL  OPIATES      300 ng/mL  PHENCYCLIDINE     25 ng/mL  THC       50 ng/mL  OXYCODONE      100 ng/mL  FENTANYL      5 ng/mL  HYDROCODONE     300 ng/mL    POCT alcohol breath test [[id number]]  (Normal) Resulted: 11/05/24 1336    Lab Status: Final result Updated: 11/05/24 1336     EXTBreath Alcohol 0            No orders to display       Procedures    ED Medication and Procedure Management   Prior to Admission Medications   Prescriptions Last Dose Informant Patient Reported? Taking?   buPROPion (Wellbutrin XL) 300 mg 24 hr tablet   No No   Sig: Take 1 tablet (300 mg total) by mouth daily   busPIRone (BUSPAR) 30 MG tablet   No No   Sig: Take 1 tablet (30 mg total) by mouth 2 (two) times a day   clonazePAM (KlonoPIN) 1 mg tablet   No No   Sig: Take 1 tablet (1 mg total) by mouth 2 (two) times a day   famotidine (PEPCID) 20 mg tablet   Yes No   Sig: Take 20 mg by mouth daily at bedtime   pantoprazole (PROTONIX) 40 mg tablet   No No   Sig: Take 1 tablet (40 mg total) by mouth daily for 14 days   sertraline (ZOLOFT) 100 mg tablet   No No   Sig: Take 2 tablets (200 mg total) by mouth daily      Facility-Administered Medications: None     Discharge Medication List as of 11/5/2024   4:18 PM        CONTINUE these medications which have NOT CHANGED    Details   buPROPion (Wellbutrin XL) 300 mg 24 hr tablet Take 1 tablet (300 mg total) by mouth daily, Starting Thu 10/24/2024, Until Wed 1/22/2025, Normal      busPIRone (BUSPAR) 30 MG tablet Take 1 tablet (30 mg total) by mouth 2 (two) times a day, Starting Thu 10/24/2024, Until Wed 1/22/2025, Normal      clonazePAM (KlonoPIN) 1 mg tablet Take 1 tablet (1 mg total) by mouth 2 (two) times a day, Starting Thu 10/24/2024, Until Wed 1/22/2025, Normal      famotidine (PEPCID) 20 mg tablet Take 20 mg by mouth daily at bedtime, Starting Wed 10/18/2023, Historical Med      pantoprazole (PROTONIX) 40 mg tablet Take 1 tablet (40 mg total) by mouth daily for 14 days, Starting Sat 1/18/2020, Until Tue 11/14/2023, Normal      sertraline (ZOLOFT) 100 mg tablet Take 2 tablets (200 mg total) by mouth daily, Starting Thu 10/24/2024, Until Wed 1/22/2025, Normal           No discharge procedures on file.  ED SEPSIS DOCUMENTATION   Time reflects when diagnosis was documented in both MDM as applicable and the Disposition within this note       Time User Action Codes Description Comment    11/5/2024  2:04 PM Cody Olson [F41.9] Anxiety     11/5/2024  2:04 PM Cody Olson [F32.1] Current moderate episode of major depressive disorder, unspecified whether recurrent (HCC)     11/5/2024  2:04 PM Cody Olson Remove [F32.1] Current moderate episode of major depressive disorder, unspecified whether recurrent (HCC)     11/5/2024  2:05 PM Cody Olson [F32.A] Depression     11/5/2024  2:12 PM Cody Olson [R45.850] Homicidal ideations                  Cody Olson DO  11/08/24 0729

## 2024-11-05 NOTE — ED NOTES
Crisis met with pt in [facility] and pt struggling with why he has 302 petitioned against him.  This writer reviewed allegations and pt outwardly denies.  Pt denies contact with ex/.  Pt has reported some anxiety but reported outpatient therapy with [facility].  Pt reported medications as prescribed.  Pt reported safe and secure housing.  Pt currently working as a [employer] .  Pt reported some arthritis.  Pt reported 1/2 pack of cigarettes a day.  Pt has pfa filed against by exwife.  Pt denies suicidal or homicidal ideation.  Pt denies psychosis.  Pt reported no trauma history.      Dr Olson did uphold 302 and psych eval to determine if patient is appropriate for 201.

## 2024-11-05 NOTE — DISCHARGE INSTRUCTIONS
Continue current medication and follow-up with your mental health team call them tomorrow to arrange close follow-up return to the emergency room if your condition worsens  This writer discussed the patients current presentation and recommended discharge plan with MD Wily.  They agree with the patient being discharged at this time due to pt not meeting 302 and 201 criteria for inpatient mental health treatment..      The patient was Instructed to follow up with their Outpatient Therpist and Psychiatrist.      The patient declined to complete a safety plan however a blank plan was provided for future use.  (if applicable, if not, delete)  In addition, the patient was instructed to call local [facility] crisis, other crisis services, 911 or to go to the nearest ER immediately if their situation changes at any time.         This writer discussed discharge plans with the patient and family-who agrees with and understands the discharge plans.            SAFETY PLAN  Warning Signs (thoughts, images, mood, behavior, situations) of a potential crisis:         Coping Skills (what can I do to take my mind off the problem, or to keep myself safe):         Outside Support (who can I reach out to for support and help):            National Suicide Prevention Hotline:  988       [facility] [phone] - Crisis   [facility] [phone] - LVF Crisis/Mobile Crisis   [phone] - SLPF Crisis   [facility]: [phone]  [facility]: [phone]   [facility] [phone] - Crisis   [facility] [phone] - Crisis      [facility] [phone] - Crisis   [facility] [phone] - Crisis   [phone] - Peer Support Talk Line (1-9pm daily)  [phone] - Teen Support Talk Line (1-9pm daily)  [phone] - [facility] [place]   [facility] [phone]- Crisis    [facility] [phone] - Crisis   [facility] [phone] - Crisis    [facility]) [phone] - [facility] Crisis

## 2024-11-05 NOTE — ASSESSMENT & PLAN NOTE
34-year-old man with history of major depressive disorder, generalized anxiety disorder, panic disorder with agoraphobia, previously seen by myself in February and admitted to inpatient behavioral health on a 201 for depressed mood, anxiety, suicidal ideations.  He reports that since then he has been adherent to twice per week therapy with concern and has been adherent to his medication regimen of Zoloft, Wellbutrin, BuSpar, Klonopin.  He appears euthymic with normal range on interview.  His thought process is organized.  He denies suicidal ideation, intention, or plan.  Denies HI or AVH.  Overtly denies the allegations presented in the 302 that he texted his wife, violating the PFA, and saying that he was suicidal.  He overtly denies being suicidal and does not currently meet criteria for major depressive episode, other mood episode, or psychotic episode.  Without any evidence of the 302 allegations being true, they are hearsay and I do not see any evidence of any mental health disorder that is currently impairing his judgment, insight, or behaviors.  If indeed he has been violating the PFA, then it is due to behaviors unrelated to a mental illness and is not a psychiatric issue but a criminal one.  According to mental health law, 302 may only be enacted for danger to self or others or inability to care for self as a result of a treatable mental illness, which I do not observe and he does not report at this time.  He is currently in outpatient treatment for his depression, anxiety and has been adherent to treatment and medications and currently denies any exacerbation or symptoms.  I recommend discharging this patient with continued outpatient mental health follow-up and patient reports detailed plan for following up with police and legal matters.  He demonstrates excellent insight on my interview and his insight is also evidenced by his presentation on his own to the hospital when discovering that he had a 302  warrant for evaluation.  Patient should continue his outpatient medications and treatment.

## 2024-11-05 NOTE — ED NOTES
Completed 302 and ACT 77 faxed to [facility].  Called [facility] spoke with Ayaka to notify them th e 302 petition was overturned after a Psych eval.  Asked [facility] if we needed to contact the Police about pt's PFA.  [facility] was unsure but suggested we should contact Police.   [facility] [place] will be notified.

## 2024-11-05 NOTE — ED NOTES
Pt presented to ED reporting that he had a 302 warrant for him.  This writer was unaware and a call placed to [facility] and spoke with Guerda.  Guerda emailed 302 warrant to ED.      Pt calm and cooperative when speaking with him.  Pt denies suicidal or homicidal ideation at this time.

## 2024-11-05 NOTE — ED NOTES
This writer discussed the patients current presentation and recommended discharge plan with MD Wily.  They agree with the patient being discharged at this time due to pt not meeting 302 and 201 criteria for inpatient mental health treatment..     The patient was Instructed to follow up with their Outpatient Therpist and Psychiatrist.     The patient declined to complete a safety plan however a blank plan was provided for future use.  (if applicable, if not, delete)  In addition, the patient was instructed to call local [facility] crisis, other crisis services, 911 or to go to the nearest ER immediately if their situation changes at any time.       This writer discussed discharge plans with the patient and family-who agrees with and understands the discharge plans.         SAFETY PLAN  Warning Signs (thoughts, images, mood, behavior, situations) of a potential crisis:       Coping Skills (what can I do to take my mind off the problem, or to keep myself safe):       Outside Support (who can I reach out to for support and help):         National Suicide Prevention Hotline:  988      [facility] [phone] - Crisis   [facility] [phone] - LVF Crisis/Mobile Crisis   [phone] - SLPF Crisis   [facility]: [phone]  [facility]: [phone]   [facility] [phone] - Crisis   [facility] [phone] - Crisis     [facility] [phone] - Crisis   [facility] [phone] - Crisis   [phone] - Peer Support Talk Line (1-9pm daily)  [phone] - Teen Support Talk Line (1-9pm daily)  [phone] - [facility] [phone]- Crisis    [facility] [phone] - Crisis   [facility] [phone] - Crisis    [facility]) [phone] - [facility] Crisis

## 2024-11-05 NOTE — Clinical Note
Beatrice Adames III should be transferred out to crisis for evaluation and disposition and has been medically cleared.

## 2024-11-05 NOTE — ED NOTES
"302 petition by Fili Adames, ex-wife of patient:    \"On 11/2/24 Gurpreet Adames had a vist with his 3 daughters from 10am-2pm.  His oldest daughter reported that at this visit her father (Gurpreet Adames) stated \"if you don't do this I will kill your mother.\"  I Lizet Adames could see my daughter visibly upset as she explained this and reported it to the police then on 11/4/24 Gurpreet called me stating he wanted to hear my voice one last time and say goodbye.  I believe that Ketan Adames III is danger to himself and others.      I Lizet Adames currently have a PFA against Gurpreet Adames and believe him reaching out and violaating the terms of the PFA shows he is not in a stable mental state.  Gurpreet Adames III had tried to kill himself and others in the past and has now threatened to kill me.  I have text messages and proof of this as well.\"  "

## 2024-11-05 NOTE — CONSULTS
TELEConsultation - Behavioral Health   Gurpreet Adames III 34 y.o. male MRN: [id number]  Unit/Bed#: Z1 H3 Encounter: [id number]    REQUIRED DOCUMENTATION:     1. This service was provided via Telemedicine.  2. Provider located in [place].  3. TeleMed provider: Travis Moseley MD  4. Identify all parties in room with patient during tele consult: patient  5. After connecting through televideo, patient was identified by name and date of birth. Parent/patient was then informed that this was being conducted confidentially over secure lines. My office door was closed. Parties in the room listed above as per #4.  Patient acknowledged consent and understanding of privacy and security of the Telemedicine visit. The patient is aware this is a billable service and understands that he can discontinue the visit at any time. I informed the patient that I have reviewed their record in Epic and presented the opportunity for them to ask any questions regarding the visit today.  The patient agreed to participate.       Assessment & Plan     Assessment & Plan  Moderate recurrent major depression (HCC)  34-year-old man with history of major depressive disorder, generalized anxiety disorder, panic disorder with agoraphobia, previously seen by myself in February and admitted to inpatient behavioral health on a 201 for depressed mood, anxiety, suicidal ideations.  He reports that since then he has been adherent to twice per week therapy with concern and has been adherent to his medication regimen of Zoloft, Wellbutrin, BuSpar, Klonopin.  He appears euthymic with normal range on interview.  His thought process is organized.  He denies suicidal ideation, intention, or plan.  Denies HI or AVH.  Overtly denies the allegations presented in the 302 that he texted his wife, violating the PFA, and saying that he was suicidal.  He overtly denies being suicidal and does not currently meet criteria for major depressive episode, other mood episode, or  psychotic episode.  Without any evidence of the 302 allegations being true, they are hearsay and I do not see any evidence of any mental health disorder that is currently impairing his judgment, insight, or behaviors.  If indeed he has been violating the PFA, then it is due to behaviors unrelated to a mental illness and is not a psychiatric issue but a criminal one.  According to mental health law, 302 may only be enacted for danger to self or others or inability to care for self as a result of a treatable mental illness, which I do not observe and he does not report at this time.  He is currently in outpatient treatment for his depression, anxiety and has been adherent to treatment and medications and currently denies any exacerbation or symptoms.  I recommend discharging this patient with continued outpatient mental health follow-up and patient reports detailed plan for following up with police and legal matters.  He demonstrates excellent insight on my interview and his insight is also evidenced by his presentation on his own to the hospital when discovering that he had a 302 warrant for evaluation.  Patient should continue his outpatient medications and treatment.      Recommendations:   --Recommend discharge with outpatient mental health follow-up with concern  --Patient should continue current-medications in the form of Zoloft, Wellbutrin, BuSpar, and Klonopin and continue to follow-up with his outpatient psychiatric provider for monitoring of efficacy and side effects  --Patient does not meet 302 criteria at this time and does not meet inpatient behavioral health hospitalization at this time and do not recommend 201  --Please TigerText with any questions or concerns.    Diagnoses, available treatment options, alternatives to treatment, and risks vs. benefits of current psychiatric treatment plan were discussed with the patient.  Prior records were reviewed in [facility].  The case was discussed with the primary  "team.    Chief Complaint: \"I do not know why there is a 302 against me.\"    History of Present Illness   Physician Requesting Consult: Cody Olson,     Reason for Consult / Principal Problem: 302 evaluation    302 petition by Fili Adames, ex-wife of patient:     \"On 11/2/24 Gurpreet Adames had a vist with his 3 daughters from 10am-2pm.  His oldest daughter reported that at this visit her father (Gurpreet Adames) stated \"if you don't do this I will kill your mother.\"  I Lizet Adames could see my daughter visibly upset as she explained this and reported it to the police then on 11/4/24 Gurpreet called me stating he wanted to hear my voice one last time and say goodbye.  I believe that Ketan Adames III is danger to himself and others.       I Lizet Adames currently have a PFA against Gurpreet Adames and believe him reaching out and violaating the terms of the PFA shows he is not in a stable mental state.  Gurpreet Adames III had tried to kill himself and others in the past and has now threatened to kill me.  I have text messages and proof of this as well.\"    34-year-old man with a history of major depressive disorder, panic disorder with agoraphobia previously seen by myself in February 2024 and was admitted on a 201 for significant anxiety depression and suicidal thoughts.    On evaluation,    Patient was calm and cooperative with interview.  He was euthymic affect with normal range of affect.  He was organized in thought process and was very clearly able to explain that one of his friends reached out and told him that his wife had petitioned a 302 against him.  He then reached out to [facility] and spoke to the delegate to informed him that there was in fact a 302 petitioned and he should report to the hospital for evaluation.  He reports that his wife alleged that he violated the PFA she has against him and threatened to kill himself.  He denies ever reaching out to her at all and denies stating that he was " suicidal.  He reports that his mood has been good ever since attending inpatient behavioral health treatment at [facility] earlier this year.  He reports that he has been adherent to his medications in the form of Wellbutrin, sertraline, Klonopin, BuSpar.  He reports that his mental health symptoms have been well-controlled and he denies any depressed mood, anhedonia, trouble sleeping or eating, decreased appetite, decreased energy or concentration and denies suicidal ideation, intention, or plan.  Denies HI or AVH.  He reports that he has been attending court for his divorce proceedings and PFA and recently was awarded partial physical custody of his children which his wife is upset about and he hypothesized that this could be a reason that she tried to 302 him.  He overtly denies any current mental health problems and acknowledged the benefit that he received from previous inpatient behavioral health treatment earlier this year but that he has been adherent to his twice a week therapy and his medications and has been generally improving his functioning and life.  He does not feel that he needs mental health treatment on the inpatient unit at this time as he is currently in twice a week outpatient psychotherapy and psychiatric treatment.      Psychiatric Review Of Systems:  Medication side effects: none  Sleep: no change  Appetite: no change  Hygiene: able to tend to instrumental and basic ADLs  Anxiety Symptoms: denies  Psychotic Symptoms: denies  Depression Symptoms: denies  Manic Symptoms: denies  PTSD Symptoms: denies  Suicidal Thoughts: denies  Homicidal Thoughts: denies    Historical Information   Psychiatric History:   Diagnoses: MDD, panic disorder with agoraphobia  Inpatient Hx: Previous hospitalization at [facility] in February of this year for depression, anxiety, suicidal ideations  Outpatient Hx: Current outpatient with concern  Medications/Trials: Zoloft, Wellbutrin, BuSpar, Klonopin    Substance Abuse  History:    Social History     Substance and Sexual Activity   Alcohol Use Yes    Comment: social     Social History     Substance and Sexual Activity   Drug Use No       I discussed substance abuse with the patient and, if pertinent, discussed risks vs benefits of decreasing frequency of use.    Family History:   No family history on file.    Social History    Rest of social history as per below:  Social History     Socioeconomic History    Marital status: Legally      Spouse name: Not on file    Number of children: Not on file    Years of education: Not on file    Highest education level: Not on file   Occupational History    Not on file   Tobacco Use    Smoking status: Every Day     Types: Cigarettes    Smokeless tobacco: Never   Substance and Sexual Activity    Alcohol use: Yes     Comment: social    Drug use: No    Sexual activity: Yes     Partners: Female   Other Topics Concern    Not on file   Social History Narrative    Not on file     Social Determinants of Health     Financial Resource Strain: High Risk (3/11/2024)    Received from [facility], [facility]    Overall Financial Resource Strain (CARDIA)     Difficulty of Paying Living Expenses: Very hard   Food Insecurity: Food Insecurity Present (3/11/2024)    Received from [facility], [facility]    Hunger Vital Sign     Worried About Running Out of Food in the Last Year: Sometimes true     Ran Out of Food in the Last Year: Often true   Transportation Needs: No Transportation Needs (3/11/2024)    Received from [facility], [facility]    PRAPARE - Transportation     Lack of Transportation (Medical): No     Lack of Transportation (Non-Medical): No   Physical Activity: Insufficiently Active (2/5/2023)    Received from [facility]    Exercise Vital Sign     Days of Exercise per Week: 3 days     Minutes of Exercise per  Session: 10 min   Stress: Stress Concern Present (3/11/2024)    Received from [facility], [facility]    [employer] [place] [employer] - Occupational Stress Questionnaire     Feeling of Stress : Rather much   Social Connections: Feeling Somewhat Isolated (3/11/2024)    Received from [facility], [facility]    OASIS : Social Isolation     How often do you feel lonely or isolated from those around you?: Sometimes   Intimate Partner Violence: Not At Risk (3/11/2024)    Received from [facility], [facility]    Humiliation, Afraid, Rape, and Kick questionnaire     Fear of Current or Ex-Partner: No     Emotionally Abused: No     Physically Abused: No     Sexually Abused: No   Housing Stability: High Risk (3/11/2024)    Received from [facility], [facility]    Housing Stability Vital Sign     Unable to Pay for Housing in the Last Year: Yes     Number of Places Lived in the Last Year: 2     Unstable Housing in the Last Year: Yes       Past Medical History:   Diagnosis Date    Fatty liver     Sleep apnea            Medical Review Of Systems:  Patient denies headache or dizziness.   Patient denies chest pain or palpitations.  Patient denies difficulty breathing or wheezing.  Patient denies nausea, vomiting, or diarrhea.  Patient denies polyuria or polydipsia.  Patient denies weakness or numbness.  Pertinent positives as per HPI.    Meds/Allergies   Allergies   Allergen Reactions    Medical Tape Rash     Welts, from ekg patches       Current Facility-Administered Medications:     buPROPion (WELLBUTRIN XL) 24 hr tablet 300 mg, Daily    busPIRone (BUSPAR) tablet 30 mg, BID    clonazePAM (KlonoPIN) tablet 1 mg, BID    famotidine (PEPCID) tablet 20 mg, HS    sertraline (ZOLOFT) tablet 200 mg, Daily    Current Medications:  Current medications as per above. All medications have  "been reviewed.   Risks, benefits, alternatives, and possible side effects of patient's psychiatric medications were discussed with patient.     Objective   Vital signs in last 24 hours:  Temp:  [98.4 °F (36.9 °C)] 98.4 °F (36.9 °C)  HR:  [84] 84  BP: (190)/(86) 190/86  Resp:  [18] 18  SpO2:  [99 %] 99 %  O2 Device: None (Room air)    Mental Status Exam:  Appearance: casually dressed, appears consistent with stated age  Motor: no psychomotor disturbances, no gait abnormalities  Behavior: cooperative, interacts with this writer appropriately  Speech: normal rate, rhythm, and volume  Mood: \"good\"  Affect: euthymic, normal range and intensity  Thought Process: organized, linear, and goal-oriented  Thought Content: denies auditory hallucinations, denies visual hallucinations, denies delusions  Risk Potential: denies suicidal ideation, plan, or intent. Denies homicidal ideation  Sensorium: Oriented to person, place, time, and situation  Cognition: cognitive ability appears intact but was not quantitatively tested  Consciousness: alert and awake  Attention: able to focus without difficulty  Insight: Good  Judgement: Good      Laboratory results:  I have personally reviewed all pertinent laboratory/tests results.  Recent Results (from the past 48 hour(s))   POCT alcohol breath test    Collection Time: 11/05/24  1:36 PM   Result Value Ref Range    EXTBreath Alcohol 0    Rapid drug screen, urine    Collection Time: 11/05/24  1:43 PM   Result Value Ref Range    Amph/Meth UR Negative Negative    Barbiturate Ur Negative Negative    Benzodiazepine Urine Positive (A) Negative    Cocaine Urine Negative Negative    Methadone Urine Negative Negative    Opiate Urine Negative Negative    PCP Ur Negative Negative    THC Urine Negative Negative    Oxycodone Urine Negative Negative    Fentanyl Urine Negative Negative    HYDROCODONE URINE Negative Negative        I have spent a total time of 60 minutes in caring for this patient on the day " of the visit/encounter including Diagnostic results, Prognosis, Risks and benefits of tx options, Instructions for management, Patient and family education, Importance of tx compliance, Risk factor reductions, Impressions, Counseling / Coordination of care, Documenting in the medical record, Reviewing / ordering tests, medicine, procedures  , Obtaining or reviewing history  , and Communicating with other healthcare professionals .     Travis Moseley MD    This note has been constructed using a voice recognition system. There may be translation, syntax, or grammatical errors. If you have any questions, please contact the dictating provider.

## 2024-12-03 ENCOUNTER — HOSPITAL ENCOUNTER (EMERGENCY)
Facility: HOSPITAL | Age: 34
Discharge: HOME | End: 2024-12-03
Attending: EMERGENCY MEDICINE
Payer: COMMERCIAL

## 2024-12-03 VITALS
HEIGHT: 69 IN | TEMPERATURE: 98.9 F | BODY MASS INDEX: 46.65 KG/M2 | WEIGHT: 315 LBS | DIASTOLIC BLOOD PRESSURE: 101 MMHG | HEART RATE: 96 BPM | RESPIRATION RATE: 20 BRPM | OXYGEN SATURATION: 98 % | SYSTOLIC BLOOD PRESSURE: 163 MMHG

## 2024-12-03 DIAGNOSIS — R30.0 DYSURIA: Primary | ICD-10-CM

## 2024-12-03 DIAGNOSIS — R31.9 HEMATURIA: ICD-10-CM

## 2024-12-03 DIAGNOSIS — Z20.2 EXPOSURE TO STD: ICD-10-CM

## 2024-12-03 LAB
BACTERIA UR QL AUTO: ABNORMAL /HPF
BILIRUB UR QL STRIP: NEGATIVE
CLARITY UR: ABNORMAL
COLOR UR: YELLOW
GLUCOSE UR STRIP-MCNC: NEGATIVE MG/DL
GRAN CASTS #/AREA URNS LPF: ABNORMAL /[LPF]
HGB UR QL STRIP.AUTO: ABNORMAL
HYALINE CASTS #/AREA URNS LPF: ABNORMAL /LPF
KETONES UR STRIP-MCNC: NEGATIVE MG/DL
LEUKOCYTE ESTERASE UR QL STRIP: ABNORMAL
MUCOUS THREADS UR QL AUTO: ABNORMAL
NITRITE UR QL STRIP: NEGATIVE
NON-SQ EPI CELLS URNS QL MICRO: ABNORMAL /HPF
PH UR STRIP.AUTO: 6 [PH]
PROT UR STRIP-MCNC: NEGATIVE MG/DL
RBC #/AREA URNS AUTO: ABNORMAL /HPF
SP GR UR STRIP.AUTO: >=1.03 (ref 1–1.03)
UROBILINOGEN UR STRIP-ACNC: <2 MG/DL
WBC #/AREA URNS AUTO: ABNORMAL /HPF

## 2024-12-03 PROCEDURE — 81001 URINALYSIS AUTO W/SCOPE: CPT | Performed by: EMERGENCY MEDICINE

## 2024-12-03 PROCEDURE — 99283 EMERGENCY DEPT VISIT LOW MDM: CPT

## 2024-12-03 PROCEDURE — 87591 N.GONORRHOEAE DNA AMP PROB: CPT

## 2024-12-03 PROCEDURE — 96372 THER/PROPH/DIAG INJ SC/IM: CPT

## 2024-12-03 PROCEDURE — 87086 URINE CULTURE/COLONY COUNT: CPT | Performed by: EMERGENCY MEDICINE

## 2024-12-03 PROCEDURE — 99284 EMERGENCY DEPT VISIT MOD MDM: CPT

## 2024-12-03 PROCEDURE — 87491 CHLMYD TRACH DNA AMP PROBE: CPT

## 2024-12-03 RX ORDER — DOXYCYCLINE 100 MG/1
100 CAPSULE ORAL 2 TIMES DAILY
Qty: 14 CAPSULE | Refills: 0 | Status: SHIPPED | OUTPATIENT
Start: 2024-12-03 | End: 2024-12-10

## 2024-12-03 RX ORDER — DOXYCYCLINE 100 MG/1
100 CAPSULE ORAL ONCE
Status: COMPLETED | OUTPATIENT
Start: 2024-12-03 | End: 2024-12-03

## 2024-12-03 RX ADMIN — DOXYCYCLINE 100 MG: 100 CAPSULE ORAL at 21:26

## 2024-12-03 RX ADMIN — CEFTRIAXONE 500 MG: 1 INJECTION, POWDER, FOR SOLUTION INTRAMUSCULAR; INTRAVENOUS at 21:27

## 2024-12-04 ENCOUNTER — TELEPHONE (OUTPATIENT)
Age: 34
End: 2024-12-04

## 2024-12-04 ENCOUNTER — RESULTS FOLLOW-UP (OUTPATIENT)
Dept: EMERGENCY DEPT | Facility: HOSPITAL | Age: 34
End: 2024-12-04

## 2024-12-04 LAB
C TRACH DNA SPEC QL NAA+PROBE: POSITIVE
N GONORRHOEA DNA SPEC QL NAA+PROBE: NEGATIVE

## 2024-12-04 NOTE — DISCHARGE INSTRUCTIONS
Being treated for STI today.  Follow-up with urology.  Abstain for approximately 2 weeks of sexual intercourse.  Alert all partners if you have a positive result.  Partner should abstain from sex for 2 weeks after their treatment.  If you have sexual intercourse with those partners again prior to their treatment you will become reinfected

## 2024-12-04 NOTE — RESULT ENCOUNTER NOTE
Called patient and notified him of positive chlamydia results and to continue doxycycline and notify partners so they can be treated.

## 2024-12-04 NOTE — ED PROVIDER NOTES
Time reflects when diagnosis was documented in both MDM as applicable and the Disposition within this note       Time User Action Codes Description Comment    12/3/2024  9:15 PM Gris Shen Add [R30.0] Dysuria     12/3/2024  9:15 PM Gris Shen Add [Z20.2] Exposure to STD     12/3/2024  9:16 PM Gris Shen Add [R31.9] Hematuria           ED Disposition       ED Disposition   Discharge    Condition   Stable    Date/Time   Tue Dec 3, 2024  9:14 PM    Comment   Gurpreet Adames III discharge to home/self care.                   Assessment & Plan       Medical Decision Making  Differential diagnosis including UTI, chlamydia, gonorrhea  Patient presenting with hematuria, burning with urination for the past 2 weeks.  Reports morning discharge.  Patient reports history of chlamydia in the past.  Patient reports wife has been unfaithful and is concern for STI.  Will treat for STI empirically and send urine.  No abdominal pain, no CVA tenderness.  No fevers or chills or signs of systemic illness.  Patient is aware that GC chlamydia will result after discharge.  UA findings appreciated.    Discussed with patient he must abstain from sexual intercourse for 2 weeks and have all partners treated.  Aware that if partners are left untreated he will be reinfected.  Reviewed reasons to return to ed.  Patient verbalized understanding of diagnosis and agreement with discharge plan of care as well as understanding of reasons to return to ed.      Amount and/or Complexity of Data Reviewed  Labs: ordered.    Risk  Prescription drug management.             Medications   cefTRIAXone (ROCEPHIN) 500 mg in lidocaine (PF) (XYLOCAINE-MPF) 1 % IM only syringe (500 mg Intramuscular Given 12/3/24 2127)   doxycycline hyclate (VIBRAMYCIN) capsule 100 mg (100 mg Oral Given 12/3/24 2126)       ED Risk Strat Scores                           SBIRT 20yo+      Flowsheet Row Most Recent Value   Initial Alcohol Screen: US AUDIT-C     1.  How often do you have a drink containing alcohol? 0 Filed at: 12/03/2024 2029   2. How many drinks containing alcohol do you have on a typical day you are drinking?  0 Filed at: 12/03/2024 2029   3a. Male UNDER 65: How often do you have five or more drinks on one occasion? 0 Filed at: 12/03/2024 2029   3b. FEMALE Any Age, or MALE 65+: How often do you have 4 or more drinks on one occassion? 0 Filed at: 12/03/2024 2029   Audit-C Score 0 Filed at: 12/03/2024 2029   FUNMILAYO: How many times in the past year have you...    Used an illegal drug or used a prescription medication for non-medical reasons? Never Filed at: 12/03/2024 2029                            History of Present Illness       Chief Complaint   Patient presents with    Possible UTI     Pt c/o burning during urination for about 2 weeks, noticed some blood in the urine today. Pt tried cranberry juice. No abdominal or back pain       Past Medical History:   Diagnosis Date    Fatty liver     Sleep apnea       History reviewed. No pertinent surgical history.   History reviewed. No pertinent family history.   Social History     Tobacco Use    Smoking status: Every Day     Current packs/day: 0.50     Types: Cigarettes    Smokeless tobacco: Never   Vaping Use    Vaping status: Never Used   Substance Use Topics    Alcohol use: Yes     Comment: social    Drug use: No      E-Cigarette/Vaping    E-Cigarette Use Never User       E-Cigarette/Vaping Substances    Nicotine No     THC No     CBD No     Flavoring No     Other No     Unknown No       I have reviewed and agree with the history as documented.     Patient is a 34-year-old male arriving for evaluation of hematuria, penile discharge, burning with urination.  Patient in the midst of divorce, has not had sexual intercourse with his wife since October but found out she had mutliple other partners.  Patient is concern for STIs.  Patient denies any abdominal pain, flank pain, testicular pain.  Patient denies any fevers,  chills.  Denies any chest pain, shortness of breath.  Patient denies any rashes or lesions.  Reports that he has been having burning with urination, this morning started with hematuria.        Review of Systems   Constitutional: Negative.    HENT: Negative.     Eyes: Negative.    Respiratory: Negative.     Cardiovascular: Negative.    Gastrointestinal: Negative.  Negative for abdominal pain, nausea and vomiting.   Endocrine: Negative.    Genitourinary:  Positive for dysuria, hematuria and penile discharge. Negative for decreased urine volume, difficulty urinating, enuresis, flank pain, frequency, genital sores, penile pain, penile swelling, scrotal swelling, testicular pain and urgency.   Musculoskeletal: Negative.    Skin: Negative.    Allergic/Immunologic: Negative.    Neurological: Negative.    Hematological: Negative.    All other systems reviewed and are negative.          Objective       ED Triage Vitals [12/03/24 2027]   Temperature Pulse Blood Pressure Respirations SpO2 Patient Position - Orthostatic VS   98.9 °F (37.2 °C) 96 (!) 163/101 20 98 % Sitting      Temp src Heart Rate Source BP Location FiO2 (%) Pain Score    -- Monitor Right arm -- --      Vitals      Date and Time Temp Pulse SpO2 Resp BP Pain Score FACES Pain Rating User   12/03/24 2029 -- -- 98 % -- -- -- -- KK   12/03/24 2027 98.9 °F (37.2 °C) 96 98 % 20 163/101 -- -- KK            Physical Exam  Vitals and nursing note reviewed.   Constitutional:       Appearance: Normal appearance. He is normal weight.   HENT:      Head: Normocephalic.      Right Ear: External ear normal.      Left Ear: External ear normal.      Nose: Nose normal.      Mouth/Throat:      Mouth: Mucous membranes are moist.      Pharynx: Oropharynx is clear. No oropharyngeal exudate or posterior oropharyngeal erythema.   Eyes:      Extraocular Movements: Extraocular movements intact.      Conjunctiva/sclera: Conjunctivae normal.      Pupils: Pupils are equal, round, and  reactive to light.   Cardiovascular:      Rate and Rhythm: Normal rate and regular rhythm.      Pulses: Normal pulses.      Heart sounds: Normal heart sounds.   Pulmonary:      Effort: Pulmonary effort is normal. No respiratory distress.      Breath sounds: Normal breath sounds. No stridor. No wheezing, rhonchi or rales.   Chest:      Chest wall: No tenderness.   Abdominal:      General: Abdomen is flat. Bowel sounds are normal. There is no distension.      Palpations: Abdomen is soft. There is no mass.      Tenderness: There is no abdominal tenderness. There is no right CVA tenderness, left CVA tenderness, guarding or rebound.      Hernia: No hernia is present. There is no hernia in the left inguinal area or right inguinal area.   Genitourinary:     Penis: Normal.       Testes: Normal.         Right: Tenderness or swelling not present. Right testis is descended.         Left: Tenderness or swelling not present. Left testis is descended.      Rectum: Guaiac result negative.      Comments: No rashes or lesions   Musculoskeletal:         General: Normal range of motion.      Cervical back: Normal range of motion and neck supple.   Lymphadenopathy:      Lower Body: No right inguinal adenopathy. No left inguinal adenopathy.   Skin:     General: Skin is warm.      Capillary Refill: Capillary refill takes less than 2 seconds.   Neurological:      General: No focal deficit present.      Mental Status: He is alert and oriented to person, place, and time. Mental status is at baseline.   Psychiatric:         Mood and Affect: Mood normal.         Behavior: Behavior normal.         Thought Content: Thought content normal.         Judgment: Judgment normal.         Results Reviewed       Procedure Component Value Units Date/Time    Urine culture [[id number]] Collected: 12/03/24 2031    Lab Status: Final result Specimen: Urine, Clean Catch Updated: 12/05/24 0721     Urine Culture No Growth <1000 cfu/mL    Chlamydia/GC amplified  DNA by PCR [[id number]]  (Abnormal) Collected: 12/03/24 2132    Lab Status: Final result Specimen: Urine, Other Updated: 12/04/24 1211     N gonorrhoeae, DNA Probe Negative     Chlamydia trachomatis, DNA Probe Positive    Narrative:      This test was performed using the FDA-approved Adithya 6800 CT/NG assay ([employer]). This test uses real-time PCR to detect Chlamydia trachomatis (CT) and Neisseria gonorrhoeae (NG). This instrument and assay have been validated by the  and performing laboratory and verified by the performing laboratory.  This test is intended as an aid in the diagnosis of chlamydial and gonococcal disease. This test has not been evaluated in patients younger than 14 years of age and is not recommended for evaluation of suspected sexual abuse. This assay is not intended to replace other exams or tests for diagnosis of urogenital infection by causative factors other than Chalmydia trachomatis (CT) and Neisseria gonorrhoeae (NG). Additional testing is recommended when the results do not correlate with clinical signs and symptoms.   Procedural Limitations  This assay has only been validated for use with male and female urine, clinician-instructed self-collected vaginal swab specimens, clinician-collected vaginal swab specimens, endocervical swab specimens collected in adithya® PCR Media and cervical specimens collected in PreservCyt® Solution. Assay performance has not been validated for use with other collection media and/or specimen types.   Detection of C. trachomatis and N. gonorrhoeaea is dependent on the number of organisms present in the specimen. Detection may be affected by specimen collection methods, patient factors, stage of infection, infecting strains, and presence of polymerase/PCR inhibitors.   When CT is present at very high concentrations, the detection of NG present at concentrations near the limit of detection may be impacted.  The presence of mucus in endocervical  specimens may lead to false negative results.  The presence of whole blood in urine and cervical specimens collected in PreservCyt Solution may lead to false negative and/or invalid test results.   Urine testing is recommended to be performed on first catch urine samples. The effects of other collection variables have not been evaluated at this time. The effects of vaginal discharge, tampon use, douching, and other collection variables have not been evaluated at this time.   This assay has not been evaluated with patients currently being treated with antimicrobial agents active against CT or NG, or patients with a history of hysterectomy.     Urine Microscopic [[id number]]  (Abnormal) Collected: 12/03/24 2031    Lab Status: Final result Specimen: Urine, Clean Catch Updated: 12/03/24 2055     RBC, UA 10-20 /hpf      WBC, UA 20-30 /hpf      Epithelial Cells Moderate /hpf      Bacteria, UA Moderate /hpf      MUCUS THREADS Moderate     Hyaline Casts, UA 1-2 /lpf      Granular Casts, UA 0-3    UA w Reflex to Microscopic w Reflex to Culture [[id number]]  (Abnormal) Collected: 12/03/24 2031    Lab Status: Final result Specimen: Urine, Clean Catch Updated: 12/03/24 2041     Color, UA Yellow     Clarity, UA Hazy     Specific Gravity, UA >=1.030     pH, UA 6.0     Leukocytes, UA Moderate     Nitrite, UA Negative     Protein, UA Negative mg/dl      Glucose, UA Negative mg/dl      Ketones, UA Negative mg/dl      Urobilinogen, UA <2.0 mg/dl      Bilirubin, UA Negative     Occult Blood, UA Small            No orders to display       Procedures    ED Medication and Procedure Management   Prior to Admission Medications   Prescriptions Last Dose Informant Patient Reported? Taking?   buPROPion (Wellbutrin XL) 300 mg 24 hr tablet   No No   Sig: Take 1 tablet (300 mg total) by mouth daily   busPIRone (BUSPAR) 30 MG tablet   No No   Sig: Take 1 tablet (30 mg total) by mouth 2 (two) times a day   clonazePAM (KlonoPIN) 1 mg tablet   No No    Sig: Take 1 tablet (1 mg total) by mouth 2 (two) times a day   famotidine (PEPCID) 20 mg tablet   Yes No   Sig: Take 20 mg by mouth daily at bedtime   pantoprazole (PROTONIX) 40 mg tablet   No No   Sig: Take 1 tablet (40 mg total) by mouth daily for 14 days   sertraline (ZOLOFT) 100 mg tablet   No No   Sig: Take 2 tablets (200 mg total) by mouth daily      Facility-Administered Medications: None     Discharge Medication List as of 12/3/2024  9:18 PM        START taking these medications    Details   doxycycline hyclate (VIBRAMYCIN) 100 mg capsule Take 1 capsule (100 mg total) by mouth 2 (two) times a day for 7 days, Starting Tue 12/3/2024, Until Tue 12/10/2024, Normal           CONTINUE these medications which have NOT CHANGED    Details   buPROPion (Wellbutrin XL) 300 mg 24 hr tablet Take 1 tablet (300 mg total) by mouth daily, Starting Thu 10/24/2024, Until Wed 1/22/2025, Normal      busPIRone (BUSPAR) 30 MG tablet Take 1 tablet (30 mg total) by mouth 2 (two) times a day, Starting Thu 10/24/2024, Until Wed 1/22/2025, Normal      clonazePAM (KlonoPIN) 1 mg tablet Take 1 tablet (1 mg total) by mouth 2 (two) times a day, Starting Thu 10/24/2024, Until Wed 1/22/2025, Normal      famotidine (PEPCID) 20 mg tablet Take 20 mg by mouth daily at bedtime, Starting Wed 10/18/2023, Historical Med      pantoprazole (PROTONIX) 40 mg tablet Take 1 tablet (40 mg total) by mouth daily for 14 days, Starting Sat 1/18/2020, Until Tue 11/14/2023, Normal      sertraline (ZOLOFT) 100 mg tablet Take 2 tablets (200 mg total) by mouth daily, Starting Thu 10/24/2024, Until Wed 1/22/2025, Normal             ED SEPSIS DOCUMENTATION   Time reflects when diagnosis was documented in both MDM as applicable and the Disposition within this note       Time User Action Codes Description Comment    12/3/2024  9:15 PM Gris Shen [R30.0] Dysuria     12/3/2024  9:15 PM Gris Shen Add [Z20.2] Exposure to STD     12/3/2024  9:16 PM  Kensecki, Gris M Add [R31.9] Hematuria                  JESSICA Holland  12/05/24 1728

## 2024-12-04 NOTE — TELEPHONE ENCOUNTER
New Patient    What is the reason for the patient’s appointment?: NP calling to schedule ED referral for gross hematuria.     Pt scheduled for next available on 1/3/25 at 740am with AP.     What office location does the patient prefer?: [place]    Does patient have Imaging/Lab Results: urine testing    Have patient records been requested?: in epic  If No, are the records showing in Epic:       INSURANCE:   Do we accept the patient's insurance or is the patient Self-Pay?:    Insurance Provider: [employer],   Plan Type/Number:   Member ID#: [id number]

## 2024-12-05 LAB — BACTERIA UR CULT: NORMAL

## 2024-12-19 LAB
EXTERNAL HIV SCREEN: NORMAL
HBA1C MFR BLD HPLC: 5.6 %

## 2025-01-02 ENCOUNTER — TELEMEDICINE (OUTPATIENT)
Dept: PSYCHIATRY | Facility: CLINIC | Age: 35
End: 2025-01-02
Payer: COMMERCIAL

## 2025-01-02 DIAGNOSIS — F33.1 MAJOR DEPRESSIVE DISORDER, RECURRENT, MODERATE (HCC): ICD-10-CM

## 2025-01-02 DIAGNOSIS — F41.1 GAD (GENERALIZED ANXIETY DISORDER): ICD-10-CM

## 2025-01-02 PROCEDURE — 99214 OFFICE O/P EST MOD 30 MIN: CPT | Performed by: STUDENT IN AN ORGANIZED HEALTH CARE EDUCATION/TRAINING PROGRAM

## 2025-01-03 RX ORDER — CLONAZEPAM 1 MG/1
1 TABLET ORAL 2 TIMES DAILY
Qty: 60 TABLET | Refills: 2 | Status: SHIPPED | OUTPATIENT
Start: 2025-01-03 | End: 2025-04-03

## 2025-01-03 RX ORDER — SERTRALINE HYDROCHLORIDE 100 MG/1
200 TABLET, FILM COATED ORAL DAILY
Qty: 60 TABLET | Refills: 2 | Status: SHIPPED | OUTPATIENT
Start: 2025-01-03 | End: 2025-04-03

## 2025-01-03 RX ORDER — BUPROPION HYDROCHLORIDE 300 MG/1
300 TABLET ORAL DAILY
Qty: 90 TABLET | Refills: 0 | Status: SHIPPED | OUTPATIENT
Start: 2025-01-03 | End: 2025-04-03

## 2025-01-03 RX ORDER — BUPROPION HYDROCHLORIDE 150 MG/1
150 TABLET ORAL EVERY MORNING
Qty: 30 TABLET | Refills: 0 | Status: SHIPPED | OUTPATIENT
Start: 2025-01-03 | End: 2025-02-02

## 2025-01-03 RX ORDER — BUSPIRONE HYDROCHLORIDE 30 MG/1
30 TABLET ORAL 2 TIMES DAILY
Qty: 60 TABLET | Refills: 2 | Status: SHIPPED | OUTPATIENT
Start: 2025-01-03 | End: 2025-04-03
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
## 2025-01-07 ENCOUNTER — TELEPHONE (OUTPATIENT)
Dept: PSYCHIATRY | Facility: CLINIC | Age: 35
End: 2025-01-07

## 2025-01-07 NOTE — TELEPHONE ENCOUNTER
Left voicemail informing patient and/or parent/guardian of the Psych Encounter form needing to be signed as a requirement from the insurance company for billing purposes. Patient can access form via [employer] and sign electronically.     Please make patient aware this form must be signed for each visit as a requirement to continue future visits with provider.    Virtual Encounter form 1/2/25 call #1

## 2025-02-05 ENCOUNTER — OFFICE VISIT (OUTPATIENT)
Dept: GASTROENTEROLOGY | Facility: CLINIC | Age: 35
End: 2025-02-05
Payer: COMMERCIAL

## 2025-02-05 VITALS
DIASTOLIC BLOOD PRESSURE: 80 MMHG | BODY MASS INDEX: 46.65 KG/M2 | HEIGHT: 69 IN | WEIGHT: 315 LBS | SYSTOLIC BLOOD PRESSURE: 152 MMHG

## 2025-02-05 DIAGNOSIS — K21.9 GASTROESOPHAGEAL REFLUX DISEASE, UNSPECIFIED WHETHER ESOPHAGITIS PRESENT: Primary | ICD-10-CM

## 2025-02-05 DIAGNOSIS — E66.01 MORBID OBESITY WITH BMI OF 50.0-59.9, ADULT (HCC): ICD-10-CM

## 2025-02-05 DIAGNOSIS — R13.10 DYSPHAGIA, UNSPECIFIED TYPE: ICD-10-CM

## 2025-02-05 DIAGNOSIS — G47.33 OBSTRUCTIVE SLEEP APNEA: ICD-10-CM

## 2025-02-05 PROCEDURE — 99204 OFFICE O/P NEW MOD 45 MIN: CPT | Performed by: NURSE PRACTITIONER

## 2025-02-05 RX ORDER — TIRZEPATIDE 5 MG/.5ML
5 INJECTION, SOLUTION SUBCUTANEOUS WEEKLY
COMMUNITY
Start: 2025-01-22

## 2025-02-05 NOTE — PATIENT INSTRUCTIONS
Do not eat 2 to 3 hours prior to bed or lying down.    Omeprazole 40 mg twice daily.  Open capsule and place in applesauce/pudding.

## 2025-02-06 NOTE — ASSESSMENT & PLAN NOTE
Patient presents to establish care after moving into the area.  Patient states he is a long time GERD patient taking pantoprazole 40 mg twice daily.  He would like to establish care with [facility].  Discussed with patient medication regimen, weight loss management and dietary discretion.  Also patient with history of MILTON and currently working with medical supply for CPAP, smoking sensation which patient states he is using nicotine patch and down to 1/4 pack a day.  All areas that can impact his GERD symptoms.  Discussed with patient changing PPI to omeprazole 40 mg twice daily and instructing patient to open the capsule and take it with applesauce.

## 2025-02-06 NOTE — PROGRESS NOTES
Name: Gurpreet Adames III      : 1990      MRN: [id number]  Encounter Provider: JESSICA Voss  Encounter Date: 2025   Encounter department: [facility]  :  Assessment & Plan  Gastroesophageal reflux disease, unspecified whether esophagitis present  Patient presents to establish care after moving into the area.  Patient states he is a long time GERD patient taking pantoprazole 40 mg twice daily.  He would like to establish care with [facility].  Discussed with patient medication regimen, weight loss management and dietary discretion.  Also patient with history of MILTON and currently working with medical supply for CPAP, smoking sensation which patient states he is using nicotine patch and down to 1/4 pack a day.  All areas that can impact his GERD symptoms.  Discussed with patient changing PPI to omeprazole 40 mg twice daily and instructing patient to open the capsule and take it with applesauce.       Dysphagia, unspecified type  Patient states he has had multiple EGDs with dilation most recently .  Noted chronic dysphagia and he states he was told he has a weak esophageal sphincter which is allowing for the increased GERD with acid reflux.         Morbid obesity with BMI of 50.0-59.9, adult ([facility])  Current weight 351 pounds.  Current BMI 51.83.  Patient states he is working with [facility] bariatrics and is currently taking Zepbound.  Patient has so far lost approximately 9 pounds with his beginning weight approximately 360 pounds.  Congratulated patient on his accomplishment and his journey.  Patient states with taking Zepbound as well as likely noted medications he is not feeling hungry at times.  Encourage patient to continue to work closely with [facility] bariatrics regarding nutrition and behavior with the weight loss management.       Obstructive sleep apnea  Patient with history of MILTON with CPAP.       Follow-up to be scheduled with   "Rochelle with patient's history and chronic GERD and dysphagia symptoms.      History of Present Illness   HPI  Gurpreet Adames III is a 34 y.o. male with past medical history of GERD, depression, MILTON and fatty liver presents for GERD symptoms and to establish care with [facility].  Patient states he recently moved to the area.  Patient states he has had EGDs with dilation in the past most recently 2023.  He states chronic dysphagia and he was told he has a weak esophageal sphincter causing increased acid reflux.  Patient is currently taking Protonix 40 mg twice daily.  On exam he denies nausea or vomiting.  States he does have occasional abdominal discomfort increased after eating.  He does note his acid reflux is also increased with activity and at times experiences regurgitation.  States he is having daily normal bowel movements.  Occasional hematochezia noted however denies melena.  Quarter pack a day smoker.  Patient states he does use a nicotine patch.  Patient states he has had some weight loss (360 pounds to 351 pounds) and is currently working with [facility] bariatrics and is currently taking Zepbound.  Most recent lab work noted 12/2024; CBC and LFTs essentially normal, increase in cholesterol.  Patient denies having colonoscopy in the past and is not aware of any family history of colon cancer.      Review of Systems       Objective   /80   Ht 5' 9\" (1.753 m)   Wt (!) 159 kg (351 lb)   BMI 51.83 kg/m²      Physical Exam  Vitals reviewed.   Constitutional:       Appearance: He is obese.   HENT:      Head: Normocephalic.      Nose: Nose normal.   Eyes:      Extraocular Movements: Extraocular movements intact.   Cardiovascular:      Rate and Rhythm: Normal rate and regular rhythm.   Pulmonary:      Breath sounds: Normal breath sounds.   Abdominal:      General: Bowel sounds are normal. There is no distension.      Palpations: Abdomen is soft.      Tenderness: There is no abdominal tenderness. "   Musculoskeletal:         General: Normal range of motion.      Cervical back: Normal range of motion.   Skin:     General: Skin is warm and dry.   Neurological:      Mental Status: He is alert and oriented to person, place, and time.   Psychiatric:         Mood and Affect: Mood normal.

## 2025-04-04 ENCOUNTER — TELEMEDICINE (OUTPATIENT)
Dept: PSYCHIATRY | Facility: CLINIC | Age: 35
End: 2025-04-04
Payer: COMMERCIAL

## 2025-04-04 DIAGNOSIS — F33.0 MILD EPISODE OF RECURRENT MAJOR DEPRESSIVE DISORDER (HCC): ICD-10-CM

## 2025-04-04 DIAGNOSIS — R07.89 ATYPICAL CHEST PAIN: ICD-10-CM

## 2025-04-04 DIAGNOSIS — F41.1 GAD (GENERALIZED ANXIETY DISORDER): Primary | ICD-10-CM

## 2025-04-04 DIAGNOSIS — F33.1 MAJOR DEPRESSIVE DISORDER, RECURRENT, MODERATE (HCC): ICD-10-CM

## 2025-04-04 DIAGNOSIS — F41.0 PANIC DISORDER: ICD-10-CM

## 2025-04-04 PROCEDURE — 90833 PSYTX W PT W E/M 30 MIN: CPT | Performed by: STUDENT IN AN ORGANIZED HEALTH CARE EDUCATION/TRAINING PROGRAM

## 2025-04-04 PROCEDURE — 99214 OFFICE O/P EST MOD 30 MIN: CPT | Performed by: STUDENT IN AN ORGANIZED HEALTH CARE EDUCATION/TRAINING PROGRAM

## 2025-04-09 ENCOUNTER — TELEPHONE (OUTPATIENT)
Dept: PSYCHIATRY | Facility: CLINIC | Age: 35
End: 2025-04-09

## 2025-04-09 NOTE — TELEPHONE ENCOUNTER
Left voicemail informing patient and/or parent/guardian of the Psych Encounter form needing to be signed as a requirement from the insurance company for billing purposes. Patient can access form via [employer] and sign electronically.     Please make patient aware this form must be signed for each visit as a requirement to continue future visits with provider.    Virtual Encounter form 4/4/25 call #1

## 2025-04-14 RX ORDER — BUPROPION HYDROCHLORIDE 300 MG/1
300 TABLET ORAL DAILY
Qty: 90 TABLET | Refills: 0 | Status: SHIPPED | OUTPATIENT
Start: 2025-04-14 | End: 2025-07-13

## 2025-04-14 RX ORDER — SERTRALINE HYDROCHLORIDE 100 MG/1
200 TABLET, FILM COATED ORAL DAILY
Qty: 60 TABLET | Refills: 2 | Status: SHIPPED | OUTPATIENT
Start: 2025-04-14 | End: 2025-07-13

## 2025-04-14 RX ORDER — PANTOPRAZOLE SODIUM 40 MG/1
40 TABLET, DELAYED RELEASE ORAL DAILY
Qty: 14 TABLET | Refills: 0 | Status: SHIPPED | OUTPATIENT
Start: 2025-04-14 | End: 2025-04-14

## 2025-04-14 RX ORDER — BUSPIRONE HYDROCHLORIDE 30 MG/1
30 TABLET ORAL 2 TIMES DAILY
Qty: 60 TABLET | Refills: 2 | Status: SHIPPED | OUTPATIENT
Start: 2025-04-14 | End: 2025-07-13
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
## 2025-04-15 ENCOUNTER — TELEPHONE (OUTPATIENT)
Dept: PSYCHIATRY | Facility: CLINIC | Age: 35
End: 2025-04-15

## 2025-04-15 NOTE — TELEPHONE ENCOUNTER
Called pt and left VM regarding scheduling 1mo f/u with Dr. Ambrose.    Gave [facility] phone number to call back and schedule with provider.

## 2025-04-15 NOTE — TELEPHONE ENCOUNTER
Left voicemail informing patient and/or parent/guardian of the Psych Encounter form needing to be signed as a requirement from the insurance company for billing purposes. Patient can access form via [employer] and sign electronically.     Please make patient aware this form must be signed for each visit as a requirement to continue future visits with provider.

## 2025-04-24 ENCOUNTER — TELEPHONE (OUTPATIENT)
Dept: PSYCHIATRY | Facility: CLINIC | Age: 35
End: 2025-04-24

## 2025-04-28 ENCOUNTER — TELEPHONE (OUTPATIENT)
Dept: PSYCHIATRY | Facility: CLINIC | Age: 35
End: 2025-04-28

## 2025-04-30 ENCOUNTER — TELEPHONE (OUTPATIENT)
Age: 35
End: 2025-04-30

## 2025-04-30 NOTE — TELEPHONE ENCOUNTER
Patient contacted the office to schedule a follow up visit with provider. Patient is now scheduled for 5/8/2025  at 9:30 am virtually.

## 2025-05-02 DIAGNOSIS — F33.1 MAJOR DEPRESSIVE DISORDER, RECURRENT, MODERATE (HCC): ICD-10-CM

## 2025-05-07 ENCOUNTER — OFFICE VISIT (OUTPATIENT)
Dept: GASTROENTEROLOGY | Facility: CLINIC | Age: 35
End: 2025-05-07
Payer: COMMERCIAL

## 2025-05-07 VITALS
SYSTOLIC BLOOD PRESSURE: 142 MMHG | WEIGHT: 315 LBS | BODY MASS INDEX: 46.65 KG/M2 | HEIGHT: 69 IN | DIASTOLIC BLOOD PRESSURE: 93 MMHG

## 2025-05-07 DIAGNOSIS — E66.01 MORBID OBESITY WITH BMI OF 50.0-59.9, ADULT (HCC): ICD-10-CM

## 2025-05-07 DIAGNOSIS — F17.200 SMOKER: ICD-10-CM

## 2025-05-07 DIAGNOSIS — K21.9 GASTROESOPHAGEAL REFLUX DISEASE WITHOUT ESOPHAGITIS: Primary | ICD-10-CM

## 2025-05-07 PROCEDURE — 99214 OFFICE O/P EST MOD 30 MIN: CPT | Performed by: INTERNAL MEDICINE

## 2025-05-07 RX ORDER — ESOMEPRAZOLE MAGNESIUM 40 MG/1
40 CAPSULE, DELAYED RELEASE ORAL
Qty: 180 CAPSULE | Refills: 4 | Status: SHIPPED | OUTPATIENT
Start: 2025-05-07

## 2025-05-07 NOTE — PROGRESS NOTES
"Name: Gurpreet Adames III      : 1990      MRN: [id number]  Encounter Provider: Brandon Bryd DO  Encounter Date: 2025   Encounter department: [facility]  :  Assessment & Plan  Gastroesophageal reflux disease without esophagitis  I have instructed him to stop his famotidine and I have prescribed esomeprazole 40 mg twice daily.  I will reassess his response in 2 to 3 months.  We did talk about potential surgical options, but in his case with his morbid obesity, Davis-en-Y bypass may be his best option  Orders:    esomeprazole (NexIUM) 40 MG capsule; Take 1 capsule (40 mg total) by mouth 2 (two) times a day before meals    Morbid obesity with BMI of 50.0-59.9, adult (HCC)  As above       Smoker  I did advise him that smoking will worsen reflux and increases risk for both erosive disease and neoplasm.  He is off cigarettes now and transitioning to vaping, as he is trying to quit           History of Present Illness   HPI  Gurpreet Adames III is a 34 y.o. male who presents in follow-up as a referral by my colleague for GERD.  He has been struggling with heartburn and regurgitation for a few years now.  He had an upper endoscopy at [facility] in  that did not show erosive disease or hiatal hernia or Mccarty's esophagus.  He was empirically dilated due to his dysphagia.  It seems that he has been on both over-the-counter omeprazole as well as pantoprazole and famotidine.  Currently he is taking famotidine 20 mg with very little success in controlling his symptoms.      Review of Systems       Objective   /93   Ht 5' 9\" (1.753 m)   Wt (!) 152 kg (336 lb 3.2 oz)   BMI 49.65 kg/m²      Physical Exam  General Appearance: NAD, cooperative, alert  Eyes: Anicteric  GI:  Soft, non-tender, non-distended; normal bowel sounds; no masses, no organomegaly   Rectal: Deferred  Musculoskeletal: No edema.  Skin:  No jaundice      "

## 2025-05-07 NOTE — ASSESSMENT & PLAN NOTE
I have instructed him to stop his famotidine and I have prescribed esomeprazole 40 mg twice daily.  I will reassess his response in 2 to 3 months.  We did talk about potential surgical options, but in his case with his morbid obesity, Davis-en-Y bypass may be his best option  Orders:    esomeprazole (NexIUM) 40 MG capsule; Take 1 capsule (40 mg total) by mouth 2 (two) times a day before meals

## 2025-05-08 ENCOUNTER — TELEMEDICINE (OUTPATIENT)
Dept: PSYCHIATRY | Facility: CLINIC | Age: 35
End: 2025-05-08
Payer: COMMERCIAL

## 2025-05-08 DIAGNOSIS — F33.1 MAJOR DEPRESSIVE DISORDER, RECURRENT, MODERATE (HCC): ICD-10-CM

## 2025-05-08 DIAGNOSIS — F41.1 GAD (GENERALIZED ANXIETY DISORDER): ICD-10-CM

## 2025-05-08 DIAGNOSIS — F33.0 MILD EPISODE OF RECURRENT MAJOR DEPRESSIVE DISORDER (HCC): Primary | ICD-10-CM

## 2025-05-08 PROCEDURE — 90833 PSYTX W PT W E/M 30 MIN: CPT | Performed by: STUDENT IN AN ORGANIZED HEALTH CARE EDUCATION/TRAINING PROGRAM

## 2025-05-08 PROCEDURE — 99214 OFFICE O/P EST MOD 30 MIN: CPT | Performed by: STUDENT IN AN ORGANIZED HEALTH CARE EDUCATION/TRAINING PROGRAM

## 2025-05-09 RX ORDER — SERTRALINE HYDROCHLORIDE 100 MG/1
200 TABLET, FILM COATED ORAL DAILY
Qty: 180 TABLET | OUTPATIENT
Start: 2025-05-09

## 2025-05-12 RX ORDER — BUPROPION HYDROCHLORIDE 300 MG/1
300 TABLET ORAL DAILY
Qty: 90 TABLET | Refills: 0 | Status: SHIPPED | OUTPATIENT
Start: 2025-05-12 | End: 2025-08-10

## 2025-05-12 RX ORDER — BUSPIRONE HYDROCHLORIDE 30 MG/1
30 TABLET ORAL 2 TIMES DAILY
Qty: 180 TABLET | Refills: 0 | Status: SHIPPED | OUTPATIENT
Start: 2025-05-12 | End: 2025-08-10

## 2025-05-12 RX ORDER — SERTRALINE HYDROCHLORIDE 100 MG/1
200 TABLET, FILM COATED ORAL DAILY
Qty: 180 TABLET | Refills: 0 | Status: SHIPPED | OUTPATIENT
Start: 2025-05-12 | End: 2025-08-10
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
## 2025-05-13 ENCOUNTER — TELEPHONE (OUTPATIENT)
Dept: PSYCHIATRY | Facility: CLINIC | Age: 35
End: 2025-05-13

## 2025-07-10 ENCOUNTER — TELEPHONE (OUTPATIENT)
Dept: PSYCHIATRY | Facility: CLINIC | Age: 35
End: 2025-07-10

## 2025-07-10 ENCOUNTER — TELEMEDICINE (OUTPATIENT)
Dept: PSYCHIATRY | Facility: CLINIC | Age: 35
End: 2025-07-10
Payer: COMMERCIAL

## 2025-07-10 DIAGNOSIS — F41.0 PANIC DISORDER: Primary | ICD-10-CM

## 2025-07-10 DIAGNOSIS — F33.0 MILD EPISODE OF RECURRENT MAJOR DEPRESSIVE DISORDER (HCC): ICD-10-CM

## 2025-07-10 DIAGNOSIS — F41.1 GAD (GENERALIZED ANXIETY DISORDER): ICD-10-CM

## 2025-07-10 DIAGNOSIS — F33.1 MAJOR DEPRESSIVE DISORDER, RECURRENT, MODERATE (HCC): ICD-10-CM

## 2025-07-10 PROCEDURE — 99214 OFFICE O/P EST MOD 30 MIN: CPT | Performed by: STUDENT IN AN ORGANIZED HEALTH CARE EDUCATION/TRAINING PROGRAM

## 2025-07-10 PROCEDURE — 90833 PSYTX W PT W E/M 30 MIN: CPT | Performed by: STUDENT IN AN ORGANIZED HEALTH CARE EDUCATION/TRAINING PROGRAM

## 2025-07-10 RX ORDER — HYDROXYZINE HYDROCHLORIDE 10 MG/1
10 TABLET, FILM COATED ORAL EVERY 12 HOURS PRN
Qty: 28 TABLET | Refills: 0 | Status: SHIPPED | OUTPATIENT
Start: 2025-07-10 | End: 2025-07-24

## 2025-07-10 NOTE — TELEPHONE ENCOUNTER
Called pt and left VM regarding scheduling 2mo f/u appt with Dr. Ambrose.    Gave [facility] phone number to call back to schedule with provider.

## 2025-07-15 ENCOUNTER — TELEPHONE (OUTPATIENT)
Dept: PSYCHIATRY | Facility: CLINIC | Age: 35
End: 2025-07-15

## 2025-07-17 RX ORDER — BUSPIRONE HYDROCHLORIDE 30 MG/1
30 TABLET ORAL 2 TIMES DAILY
Qty: 180 TABLET | Refills: 0 | Status: SHIPPED | OUTPATIENT
Start: 2025-07-17 | End: 2025-10-15

## 2025-07-17 RX ORDER — BUPROPION HYDROCHLORIDE 300 MG/1
300 TABLET ORAL DAILY
Qty: 90 TABLET | Refills: 0 | Status: SHIPPED | OUTPATIENT
Start: 2025-07-17 | End: 2025-10-15
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
## 2025-07-21 ENCOUNTER — TELEPHONE (OUTPATIENT)
Dept: PSYCHIATRY | Facility: CLINIC | Age: 35
End: 2025-07-21
